# Patient Record
Sex: FEMALE | Race: BLACK OR AFRICAN AMERICAN | NOT HISPANIC OR LATINO | Employment: FULL TIME | URBAN - METROPOLITAN AREA
[De-identification: names, ages, dates, MRNs, and addresses within clinical notes are randomized per-mention and may not be internally consistent; named-entity substitution may affect disease eponyms.]

---

## 2020-06-03 ENCOUNTER — HISTORICAL (OUTPATIENT)
Dept: RADIOLOGY | Facility: HOSPITAL | Age: 55
End: 2020-06-03

## 2020-11-03 ENCOUNTER — HISTORICAL (OUTPATIENT)
Dept: LAB | Facility: HOSPITAL | Age: 55
End: 2020-11-03

## 2020-11-03 LAB
DEPRECATED CALCIDIOL+CALCIFEROL SERPL-MC: 16.4 NG/ML (ref 6.6–49.9)
ESTRADIOL SERPL HS-MCNC: <24 PG/ML
FSH SERPL-ACNC: 68.54 MIU/ML
T3FREE SERPL-MCNC: 2.5 PG/ML (ref 2.2–4)
T4 SERPL-MCNC: 8.5 MCG/DL (ref 4.7–13.3)
TESTOST SERPL-MCNC: 15.31 NG/DL (ref 12.4–35.76)
TSH SERPL-ACNC: 0.66 MIU/ML (ref 0.36–3.74)

## 2020-11-17 ENCOUNTER — HISTORICAL (OUTPATIENT)
Dept: LAB | Facility: HOSPITAL | Age: 55
End: 2020-11-17

## 2020-11-17 LAB
ABS NEUT (OLG): 3.22 X10(3)/MCL (ref 2.1–9.2)
ALBUMIN SERPL-MCNC: 3.8 GM/DL (ref 3.5–5)
ALBUMIN/GLOB SERPL: 1.2 RATIO (ref 1.1–2)
ALP SERPL-CCNC: 71 UNIT/L (ref 40–150)
ALT SERPL-CCNC: 19 UNIT/L (ref 0–55)
AST SERPL-CCNC: 19 UNIT/L (ref 5–34)
BASOPHILS # BLD AUTO: 0 X10(3)/MCL (ref 0–0.2)
BASOPHILS NFR BLD AUTO: 0 %
BILIRUB SERPL-MCNC: 0.5 MG/DL
BILIRUBIN DIRECT+TOT PNL SERPL-MCNC: 0.2 MG/DL (ref 0–0.5)
BILIRUBIN DIRECT+TOT PNL SERPL-MCNC: 0.3 MG/DL (ref 0–0.8)
BUN SERPL-MCNC: 7.8 MG/DL (ref 9.8–20.1)
CALCIUM SERPL-MCNC: 9.3 MG/DL (ref 8.4–10.2)
CHLORIDE SERPL-SCNC: 99 MMOL/L (ref 98–107)
CHOLEST SERPL-MCNC: 259 MG/DL
CHOLEST/HDLC SERPL: 5 {RATIO} (ref 0–5)
CO2 SERPL-SCNC: 27 MMOL/L (ref 22–29)
CREAT SERPL-MCNC: 0.72 MG/DL (ref 0.55–1.02)
EOSINOPHIL # BLD AUTO: 0.3 X10(3)/MCL (ref 0–0.9)
EOSINOPHIL NFR BLD AUTO: 5 %
ERYTHROCYTE [DISTWIDTH] IN BLOOD BY AUTOMATED COUNT: 12.9 % (ref 11.5–17)
GLOBULIN SER-MCNC: 3.1 GM/DL (ref 2.4–3.5)
GLUCOSE SERPL-MCNC: 82 MG/DL (ref 74–100)
HCT VFR BLD AUTO: 43.6 % (ref 37–47)
HDLC SERPL-MCNC: 55 MG/DL (ref 35–60)
HGB BLD-MCNC: 13.8 GM/DL (ref 12–16)
IMM GRANULOCYTES # BLD AUTO: 0.01 % (ref 0–0.02)
IMM GRANULOCYTES NFR BLD AUTO: 0.2 % (ref 0–0.43)
LDLC SERPL CALC-MCNC: 163 MG/DL (ref 50–140)
LYMPHOCYTES # BLD AUTO: 2.2 X10(3)/MCL (ref 0.6–4.6)
LYMPHOCYTES NFR BLD AUTO: 36 %
MCH RBC QN AUTO: 33.1 PG (ref 27–31)
MCHC RBC AUTO-ENTMCNC: 31.7 GM/DL (ref 33–36)
MCV RBC AUTO: 104.6 FL (ref 80–94)
MONOCYTES # BLD AUTO: 0.4 X10(3)/MCL (ref 0.1–1.3)
MONOCYTES NFR BLD AUTO: 7 %
NEUTROPHILS # BLD AUTO: 3.22 X10(3)/MCL (ref 1.4–7.9)
NEUTROPHILS NFR BLD AUTO: 52 %
PLATELET # BLD AUTO: 326 X10(3)/MCL (ref 130–400)
PMV BLD AUTO: 8.3 FL (ref 9.4–12.4)
POTASSIUM SERPL-SCNC: 4.3 MMOL/L (ref 3.5–5.1)
PROT SERPL-MCNC: 6.9 GM/DL (ref 6.4–8.3)
RBC # BLD AUTO: 4.17 X10(6)/MCL (ref 4.2–5.4)
SODIUM SERPL-SCNC: 134 MMOL/L (ref 136–145)
TRIGL SERPL-MCNC: 207 MG/DL (ref 37–140)
VLDLC SERPL CALC-MCNC: 41 MG/DL
WBC # SPEC AUTO: 6.2 X10(3)/MCL (ref 4.5–11.5)

## 2021-04-15 ENCOUNTER — HISTORICAL (OUTPATIENT)
Dept: LAB | Facility: HOSPITAL | Age: 56
End: 2021-04-15

## 2021-04-15 LAB
ABS NEUT (OLG): 3.05 X10(3)/MCL (ref 2.1–9.2)
ALBUMIN SERPL-MCNC: 3.5 GM/DL (ref 3.5–5)
ALBUMIN/GLOB SERPL: 1.1 RATIO (ref 1.1–2)
ALP SERPL-CCNC: 61 UNIT/L (ref 40–150)
ALT SERPL-CCNC: 9 UNIT/L (ref 0–55)
AST SERPL-CCNC: 15 UNIT/L (ref 5–34)
BASOPHILS # BLD AUTO: 0 X10(3)/MCL (ref 0–0.2)
BASOPHILS NFR BLD AUTO: 1 %
BILIRUB SERPL-MCNC: 0.7 MG/DL
BILIRUBIN DIRECT+TOT PNL SERPL-MCNC: 0.2 MG/DL (ref 0–0.5)
BILIRUBIN DIRECT+TOT PNL SERPL-MCNC: 0.5 MG/DL (ref 0–0.8)
BUN SERPL-MCNC: 7.3 MG/DL (ref 9.8–20.1)
CALCIUM SERPL-MCNC: 8.6 MG/DL (ref 8.4–10.2)
CHLORIDE SERPL-SCNC: 107 MMOL/L (ref 98–107)
CHOLEST SERPL-MCNC: 210 MG/DL
CHOLEST/HDLC SERPL: 4 {RATIO} (ref 0–5)
CO2 SERPL-SCNC: 23 MMOL/L (ref 22–29)
CREAT SERPL-MCNC: 0.76 MG/DL (ref 0.55–1.02)
DEPRECATED CALCIDIOL+CALCIFEROL SERPL-MC: 29.2 NG/ML (ref 30–80)
EOSINOPHIL # BLD AUTO: 0.3 X10(3)/MCL (ref 0–0.9)
EOSINOPHIL NFR BLD AUTO: 5 %
ERYTHROCYTE [DISTWIDTH] IN BLOOD BY AUTOMATED COUNT: 13.4 % (ref 11.5–17)
ESTRADIOL SERPL HS-MCNC: 29 PG/ML
FSH SERPL-ACNC: 36.08 MIU/ML
GLOBULIN SER-MCNC: 3.1 GM/DL (ref 2.4–3.5)
GLUCOSE SERPL-MCNC: 88 MG/DL (ref 74–100)
HCT VFR BLD AUTO: 42.5 % (ref 37–47)
HDLC SERPL-MCNC: 54 MG/DL (ref 35–60)
HGB BLD-MCNC: 13.5 GM/DL (ref 12–16)
IMM GRANULOCYTES # BLD AUTO: 0.01 % (ref 0–0.02)
IMM GRANULOCYTES NFR BLD AUTO: 0.2 % (ref 0–0.43)
LDLC SERPL CALC-MCNC: 115 MG/DL (ref 50–140)
LYMPHOCYTES # BLD AUTO: 1.6 X10(3)/MCL (ref 0.6–4.6)
LYMPHOCYTES NFR BLD AUTO: 30 %
MCH RBC QN AUTO: 33.4 PG (ref 27–31)
MCHC RBC AUTO-ENTMCNC: 31.8 GM/DL (ref 33–36)
MCV RBC AUTO: 105.2 FL (ref 80–94)
MONOCYTES # BLD AUTO: 0.4 X10(3)/MCL (ref 0.1–1.3)
MONOCYTES NFR BLD AUTO: 7 %
NEUTROPHILS # BLD AUTO: 3.05 X10(3)/MCL (ref 1.4–7.9)
NEUTROPHILS NFR BLD AUTO: 56 %
PLATELET # BLD AUTO: 346 X10(3)/MCL (ref 130–400)
PMV BLD AUTO: 8.4 FL (ref 9.4–12.4)
POTASSIUM SERPL-SCNC: 4.2 MMOL/L (ref 3.5–5.1)
PROT SERPL-MCNC: 6.6 GM/DL (ref 6.4–8.3)
RBC # BLD AUTO: 4.04 X10(6)/MCL (ref 4.2–5.4)
SODIUM SERPL-SCNC: 138 MMOL/L (ref 136–145)
TESTOST SERPL-MCNC: 74.12 NG/DL (ref 12.4–35.76)
TRIGL SERPL-MCNC: 203 MG/DL (ref 37–140)
TSH SERPL-ACNC: 1.77 UIU/ML (ref 0.35–4.94)
VLDLC SERPL CALC-MCNC: 41 MG/DL
WBC # SPEC AUTO: 5.4 X10(3)/MCL (ref 4.5–11.5)

## 2021-04-20 ENCOUNTER — HISTORICAL (OUTPATIENT)
Dept: RADIOLOGY | Facility: HOSPITAL | Age: 56
End: 2021-04-20

## 2021-05-03 ENCOUNTER — HISTORICAL (OUTPATIENT)
Dept: RADIOLOGY | Facility: HOSPITAL | Age: 56
End: 2021-05-03

## 2021-08-23 LAB — GLUCOSE SERPL-MCNC: 345 MG/DL (ref 74–106)

## 2022-04-10 ENCOUNTER — HISTORICAL (OUTPATIENT)
Dept: ADMINISTRATIVE | Facility: HOSPITAL | Age: 57
End: 2022-04-10
Payer: COMMERCIAL

## 2022-04-29 VITALS
WEIGHT: 155.56 LBS | HEIGHT: 64 IN | BODY MASS INDEX: 26.56 KG/M2 | DIASTOLIC BLOOD PRESSURE: 88 MMHG | OXYGEN SATURATION: 99 % | SYSTOLIC BLOOD PRESSURE: 133 MMHG

## 2022-05-05 ENCOUNTER — PATIENT OUTREACH (OUTPATIENT)
Dept: ADMINISTRATIVE | Facility: HOSPITAL | Age: 57
End: 2022-05-05
Payer: COMMERCIAL

## 2022-05-05 ENCOUNTER — TELEPHONE (OUTPATIENT)
Dept: ADMINISTRATIVE | Facility: HOSPITAL | Age: 57
End: 2022-05-05
Payer: COMMERCIAL

## 2022-09-13 LAB — BCS RECOMMENDATION EXT: NORMAL

## 2022-09-15 ENCOUNTER — HISTORICAL (OUTPATIENT)
Dept: ADMINISTRATIVE | Facility: HOSPITAL | Age: 57
End: 2022-09-15
Payer: COMMERCIAL

## 2022-10-28 LAB
HUMAN PAPILLOMAVIRUS (HPV): NORMAL
PAP RECOMMENDATION EXT: NORMAL
PAP SMEAR: NORMAL

## 2023-04-27 ENCOUNTER — TELEPHONE (OUTPATIENT)
Dept: FAMILY MEDICINE | Facility: CLINIC | Age: 58
End: 2023-04-27
Payer: COMMERCIAL
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
## 2023-08-08 LAB
CHOLEST SERPL-MSCNC: 225 MG/DL (ref 0–200)
HDLC SERPL-MCNC: 58 MG/DL (ref 35–70)
LDLC SERPL CALC-MCNC: 130 MG/DL (ref 0–160)
TRIGL SERPL-MCNC: 184 MG/DL (ref 40–160)

## 2023-09-17 ENCOUNTER — HOSPITAL ENCOUNTER (EMERGENCY)
Facility: HOSPITAL | Age: 58
Discharge: HOME | End: 2023-09-17
Attending: STUDENT IN AN ORGANIZED HEALTH CARE EDUCATION/TRAINING PROGRAM
Payer: COMMERCIAL

## 2023-09-17 VITALS
TEMPERATURE: 98 F | SYSTOLIC BLOOD PRESSURE: 155 MMHG | RESPIRATION RATE: 18 BRPM | HEART RATE: 92 BPM | WEIGHT: 162 LBS | BODY MASS INDEX: 28 KG/M2 | DIASTOLIC BLOOD PRESSURE: 86 MMHG | OXYGEN SATURATION: 100 %

## 2023-09-17 DIAGNOSIS — S50.12XA CONTUSION OF LEFT FOREARM, INITIAL ENCOUNTER: ICD-10-CM

## 2023-09-17 DIAGNOSIS — T14.90XA TRAUMA: ICD-10-CM

## 2023-09-17 DIAGNOSIS — S50.812A ABRASION OF LEFT FOREARM, INITIAL ENCOUNTER: ICD-10-CM
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
## 2023-09-17 PROCEDURE — 63600175 PHARM REV CODE 636 W HCPCS: Performed by: STUDENT IN AN ORGANIZED HEALTH CARE EDUCATION/TRAINING PROGRAM

## 2023-09-17 PROCEDURE — 25000003 PHARM REV CODE 250: Performed by: STUDENT IN AN ORGANIZED HEALTH CARE EDUCATION/TRAINING PROGRAM

## 2023-09-17 PROCEDURE — 96372 THER/PROPH/DIAG INJ SC/IM: CPT | Performed by: STUDENT IN AN ORGANIZED HEALTH CARE EDUCATION/TRAINING PROGRAM

## 2023-09-17 PROCEDURE — 99284 EMERGENCY DEPT VISIT MOD MDM: CPT

## 2023-09-17 RX ORDER — BACITRACIN ZINC 500 UNIT/G
OINTMENT (GRAM) TOPICAL 3 TIMES DAILY
Qty: 30 G | Refills: 0 | Status: SHIPPED | OUTPATIENT
Start: 2023-09-17 | End: 2023-09-27

## 2023-09-17 RX ORDER — KETOROLAC TROMETHAMINE 30 MG/ML
30 INJECTION, SOLUTION INTRAMUSCULAR; INTRAVENOUS
Status: COMPLETED | OUTPATIENT
Start: 2023-09-17 | End: 2023-09-17

## 2023-09-17 RX ORDER — TRAMADOL HYDROCHLORIDE 50 MG/1
50 TABLET ORAL EVERY 6 HOURS PRN
Qty: 12 TABLET | Refills: 0 | Status: SHIPPED | OUTPATIENT
Start: 2023-09-17 | End: 2023-09-20 | Stop reason: SDUPTHER

## 2023-09-17 RX ORDER — TRAMADOL HYDROCHLORIDE 50 MG/1
50 TABLET ORAL
Status: COMPLETED | OUTPATIENT
Start: 2023-09-17 | End: 2023-09-17

## 2023-09-17 RX ORDER — BACITRACIN 500 [USP'U]/G
500 OINTMENT TOPICAL
Status: COMPLETED | OUTPATIENT
Start: 2023-09-17 | End: 2023-09-17

## 2023-09-17 RX ORDER — IBUPROFEN 800 MG/1
800 TABLET ORAL EVERY 6 HOURS PRN
Qty: 20 TABLET | Refills: 0 | Status: SHIPPED | OUTPATIENT
Start: 2023-09-17 | End: 2023-09-20 | Stop reason: ALTCHOICE

## 2023-09-17 RX ADMIN — KETOROLAC TROMETHAMINE 30 MG: 30 INJECTION, SOLUTION INTRAMUSCULAR at 09:09

## 2023-09-17 RX ADMIN — TRAMADOL HYDROCHLORIDE 50 MG: 50 TABLET, COATED ORAL at 10:09

## 2023-09-17 RX ADMIN — BACITRACIN 500 G: 500 OINTMENT TOPICAL at 11:09

## 2023-09-17 NOTE — ED NOTES
Spoke with patient states has a ride that can come pick her up still having pain 8/10 new orders to give pain meds . Report given to Margi Maldonado

## 2023-09-17 NOTE — DISCHARGE INSTRUCTIONS
Monitor blood pressure at home   Return to ER with any persistently elevated BP >160/90 and/or symptoms of HA, vision changes, nausea/vomiting/chest pain, shortness of breath, dizziness, weakness, numbness/tingling or any other concerning symptoms  Keep wounds clean and dry/use antibacterial ointment
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
## 2023-09-17 NOTE — Clinical Note
"Marj"Roosevelt Lazar was seen and treated in our emergency department on 9/17/2023.  She may return to work after being cleared by follow-up physician .  Must be cleared by primary physician or orthopedic  before returning  to work     If you have any questions or concerns, please don't hesitate to call.      Dr. Weber/Suleiman HEALY"

## 2023-09-18 LAB
BCS RECOMMENDATION EXT: NORMAL
BCS RECOMMENDATION EXT: NORMAL

## 2023-09-20 ENCOUNTER — OFFICE VISIT (OUTPATIENT)
Dept: ORTHOPEDICS | Facility: CLINIC | Age: 58
End: 2023-09-20
Payer: COMMERCIAL

## 2023-09-20 VITALS
DIASTOLIC BLOOD PRESSURE: 91 MMHG | HEIGHT: 64 IN | SYSTOLIC BLOOD PRESSURE: 153 MMHG | WEIGHT: 150 LBS | HEART RATE: 89 BPM | BODY MASS INDEX: 25.61 KG/M2

## 2023-09-20 DIAGNOSIS — M79.642 LEFT HAND PAIN: Primary | ICD-10-CM

## 2023-09-20 DIAGNOSIS — S51.812A LACERATION OF LEFT FOREARM, INITIAL ENCOUNTER: ICD-10-CM

## 2023-09-20 DIAGNOSIS — T14.8XXA CONTUSION OF BONE: ICD-10-CM

## 2023-09-20 DIAGNOSIS — T14.8XXA MUSCLE TEAR: ICD-10-CM

## 2023-09-20 PROCEDURE — 99204 OFFICE O/P NEW MOD 45 MIN: CPT | Mod: ,,, | Performed by: ORTHOPAEDIC SURGERY

## 2023-09-20 PROCEDURE — 99204 PR OFFICE/OUTPT VISIT, NEW, LEVL IV, 45-59 MIN: ICD-10-PCS | Mod: ,,, | Performed by: ORTHOPAEDIC SURGERY

## 2023-09-20 RX ORDER — KETOCONAZOLE 20 MG/ML
SHAMPOO, SUSPENSION TOPICAL
COMMUNITY
Start: 2023-08-07

## 2023-09-20 RX ORDER — ATORVASTATIN CALCIUM 20 MG/1
TABLET, FILM COATED ORAL
COMMUNITY
End: 2023-10-18 | Stop reason: SDUPTHER

## 2023-09-20 RX ORDER — LOSARTAN POTASSIUM AND HYDROCHLOROTHIAZIDE 12.5; 5 MG/1; MG/1
1 TABLET ORAL DAILY
COMMUNITY
End: 2023-10-18

## 2023-09-20 RX ORDER — KETOCONAZOLE 20 MG/G
CREAM TOPICAL
COMMUNITY
Start: 2023-08-07

## 2023-09-20 RX ORDER — TRAMADOL HYDROCHLORIDE 50 MG/1
50 TABLET ORAL EVERY 6 HOURS PRN
Qty: 21 TABLET | Refills: 0 | Status: SHIPPED | OUTPATIENT
Start: 2023-09-20 | End: 2023-11-02

## 2023-09-20 RX ORDER — MELOXICAM 15 MG/1
15 TABLET ORAL DAILY PRN
Qty: 30 TABLET | Refills: 0 | Status: SHIPPED | OUTPATIENT
Start: 2023-09-20 | End: 2023-10-19

## 2023-09-20 RX ORDER — ERGOCALCIFEROL 1.25 MG/1
50000 CAPSULE ORAL
COMMUNITY
Start: 2023-08-30

## 2023-09-20 NOTE — LETTER
[facility] FORM 1010 - REQUEST OF AUTHORIZATION/CARRIER OR SELF INSURED EMPLOYER RESPONSE  PLEASE PRINT OR TYPE  SECTION 1. IDENTIFYING INFORMATION - To Be Filled Out By Health Care Provider    P  A Last Name:   Nagi First:   Marj Middle:     Street Address, [facility], Zip:   [address] , [place] [address]   T  I Last 4 Digits of Social Security Number:   [id number] YOB: 1965 Phone Number:    [phone] (home) [phone] (work) Date of Injury:   09/17/2023   E  N  T Employers Name:    [employer]   Street Address, [facility], Zip:   [address] [place], [place], [address] Phone Number:   [phone]     C  A  R  R Name:   [place] :   Kaitlin Osborne Claim Number (if known):   [id number]     I  E  R Street Address, City, State, Zip:   [facility]  [address] [place] [address] Email Address:    Phone Number:   [phone] Fax Number:   [phone]   SECTION 2. REQUEST FOR AUTHORIZATION - To Be Filled Out By Health Care Provider      P Requesting Health Care Provider:   Oscar Chapa  Phone Number:   [phone] Fax Number:   [phone]   R  O Street Address, City, State, Zip:   [address] [address] Email:    [email or web address]   V  I Diagnosis:   Muscle tear and laceration  CPT/DRG Code:   32273 ICD/DSM Code:   T14.8  S51.812A   D  E Requested Treatment or Testing (Attach Supplement If Needed): MRI of the left forearm to see if the muscle is torn    R Reason for Treatment or Testing (Attach Supplement If Needed): see attached notes     INFORMATION REQUIRED BY RULE TO BE INCLUDED WITH REQUEST FOR AUTHORIZATION - To Be Filled Out By Health Care Provider  (Following is the required minimum information for Request of Authorization (LAC 40:2715 (C))                    [x]  History provided to the level of condition and as provided by Medical Treatment Schedule   P                  [x]  Physical Findings/Clinical Tests   R                  []  Documented functional improvements from prior treatment   O                 []  Test/imaging results   V                 []  Treatment Plan including services being requested along with the frequency and duration   I  D  E                                                                                                                                            [x]     Faxed          to the [employer]/Self Insured Employer on this the      I hereby certify that this completed form and above required information was                                                           20     day of 09, 2023                                                                                                                             []      Emailed                  (day)                 (month)         (year)   R Signature of Health Care Provider:    Oscar Zhang per attached records Printed Name:    Oscar goddard attached records   SECTION 3. RESPONSE OF CARRIER/SELF INSURED EMPLOYER FOR AUTHORIZATION  (Check appropriate box below and return to requesting Health Care Provider, Claimant and Claimant  as provided by rule)       []  The requested Treatment or Testing is approved       []  The requested Treatment or Testing is approved with modifications (Attach summary of reasons and explanation of any modifications)       []  The requested Treatment or Testing is denied because                                       []          Not in accordance with Medical Treatment Schedule or R.S.23:1203.1(D) (Attach summary of reasons)                                       []          The request, or a portion thereof, is not related to the on-the-job injury                                       []          The claim is being denied as non-compensable                                       []          Other (Attach brief explanation)   C  A  R  R  I                                                                                                                                             []     Faxed          to the Health Care Provider (and to the  of                                                                                                                                                                             Claimant if one exists, if denied or approved with   I hereby certify that this response of  [employer]/Self Insured Employer for Authorization was                                                 modification) on this the                                                                                                                                                                                     ______  day of   _______ ,   _______                                                                                                                             []      Emailed                  (day)                 (month)         (year)   E  R Signature of Carrier/Self Insured Employer or Utilization Review Company: Printed Name:           []   The prior denied or approved with modification request is now  approved                                                                                                                                               []     Faxed          to the Health Care Provider and  of Claimant                                                                                                                                                                                                    if one exists on this the   I hereby certify that this response of  [employer]/Self Insured Employer for Authorization was                                      ______  day of   _______ ,   _______                                                                                                                                             []      Emailed                      (day)                  (month)         (year)    Signature of Carrier/Self Insured Employer or Utilization Review Company:  Printed Name:       SECTION 4. FIRST REQUEST   (Form 1010A is required to be filled out by Car[employer]/Self Insured Employer and Health Care Provider)   C           []  The requested Treatment or Testing is delayed because minimum information required by rule was not provided   A  R  R  I                                                                                                                                            []     Faxed                 to the Health Care Provider on this the      I hereby certify that this First Request and accompanying Form 1010A was                                                       ______  day of   _______ ,   _______                                                                                                                             []      Emailed                  (day)                 (month)         (year)   E  R Signature of Carrier/Self Insured Employer or Utilization Review Company:     P  R  O  V  I  D                                                                                                                                            []     Faxed          to the Carrier/Self Insured Employer on this the                                I hereby certify that a response to the First Request and                                               accompanying Form 1010A was                                                                                 ______  day of   _______ ,   _______                                                                                                                             []      Emailed                  (day)                 (month)         (year)   E  R Signature of Health Care Provider: Printed Name:   SECTION 5. SUSPENSION OF PRIOR AUTHORIZATION DUE TO LACK OF INFORMATION       C   Suspension of Prior Authorization Process  due to Lack of Information     A  R           []  The requested Treatment or Testing is delayed due to a Suspension of Prior Authorization Due to Lack of Information   R  I  E                                                                                                                           []     Faxed                 to the Health Care Provider on this the      I hereby certify that this Suspension of Prior Authorization was                                                       ______  day of   _______ ,   _______                                                                                                                            []      Emailed                  (day)                 (month)         (year)   R Signature of Carrier/Self Insured Employer or Utilization Review Company:   Printed Name:       P    Appeal of Suspension to Medical Services Section by Health Care Provider     R  O  V  I hereby certify that this form and all information previously submitted to [employer]/Self Insured Employer   was faxed to [employer]  (Fax Number: [phone] this ______  day of   _______ ,   _______   I  D  E                                                                                                                           []     Faxed       to the Carrier/Self Insured Employer on this the      I hereby certify that this Appeal of Suspension of Prior Authorization was                                        ______  day of   _______ ,   _______                                                                                                                            []      Emailed                  (day)                 (month)         (year)   R Signature of Health Care Provider:   Printed Name:     SECTION 6. DETERMINATION OF MEDICAL SERVICES SECTION              []  The required information of LAC40:2715(C) was not provided              []  The required information of LAC40:2715(C) was provided    O  W  C  A                                                                                                                           []     Faxed           to the Health Care Provider  & [employer]/Self                                                                                                                                                                       Insured Employer on this the                      I hereby certify that a written determination was                                                                ______  day of   _______ ,   _______                                                                                                                            []      Emailed                  (day)                 (month)         (year)    Signature: Printed Name:     SECTION 7. HEALTH CARE PROVIDER RESPONSE TO MEDICAL SERVICES DETERMINATION   P  R  O  V  I  D                                                                                                                             []     Faxed       to the Car[employer]/Self Insured Employer on this the   I hereby certify that additional information, pursuant to the determination of                                       Medical Services Section, was                                    []      Emailed              ______  day of   _______ ,   _______                                                                                                                                                                     (day)                 (month)         (year)   E  R Signature of Health Care Provider: Printed Name:
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
## 2023-09-20 NOTE — LETTER
[facility]  [address] [place] [address]. [address], [address]  Phone: [phone]  Fax: [phone]    Name:Marj Lazar  :1965   Date:2023     PATIENT IS UNABLE TO WORK AS OF:  23   [_] Pending treatment.  [X] For approximately [_] Days [3] Weeks [_] Months  [X] Pending diagnostic testing.  [_] Pending surgical treatment.  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  Although a sedentary job is defined as one which involved sitting, a certain amount of walking and standing are required only occasionally and other sedentary criteria are met.    [_] LIGHT WORK: Lifting 20 pounds with frequent lifting and/or carrying objects weighing up to 10 pounds.  Even though the weight lifted may be only a negotiable amount, a job is in the category when it involves sitting most of the time with a degree of pushing/pulling of arm and/or leg controls.    [_] MEDIUM WORK: Lifting of 50 pounds maximum with frequent lifting and/or carrying of objects up to 25 pounds.    [_] HEAVY WORK: Lifting of 100 pounds maximum with frequent lifting and/or carrying objects up to 50 pounds.    [_] VERY HEAVY WORK: Lifting objects in excess of 100 pounds with frequent lifting and/or carrying of objects weighing 50 pounds or more.    [_] REGULAR DUTY: [_] No Restrictions. [_] With Restrictions (See comments below0:    COMMENTS    Oscar Chapa MD

## 2023-09-25 ENCOUNTER — TELEPHONE (OUTPATIENT)
Dept: ORTHOPEDICS | Facility: CLINIC | Age: 58
End: 2023-09-25
Payer: COMMERCIAL

## 2023-09-25 NOTE — TELEPHONE ENCOUNTER
[facility].     CALLED THEM BACK AND THEY STATED THEY NEED THE LAST OFFICE NOTE; TRANSFERRED TO MEDICAL RECORDS.

## 2023-09-28 ENCOUNTER — HOSPITAL ENCOUNTER (OUTPATIENT)
Dept: RADIOLOGY | Facility: HOSPITAL | Age: 58
Discharge: HOME | End: 2023-09-28
Attending: ORTHOPAEDIC SURGERY
Payer: COMMERCIAL

## 2023-09-28 DIAGNOSIS — S51.812A LACERATION OF LEFT FOREARM, INITIAL ENCOUNTER: ICD-10-CM

## 2023-09-28 DIAGNOSIS — T14.8XXA CONTUSION OF BONE: ICD-10-CM

## 2023-09-28 DIAGNOSIS — T14.8XXA MUSCLE TEAR: ICD-10-CM

## 2023-09-28 PROCEDURE — 73218 MRI UPPER EXTREMITY W/O DYE: CPT | Mod: TC,LT

## 2023-10-04 ENCOUNTER — OFFICE VISIT (OUTPATIENT)
Dept: ORTHOPEDICS | Facility: CLINIC | Age: 58
End: 2023-10-04
Payer: COMMERCIAL

## 2023-10-04 DIAGNOSIS — T14.8XXA MUSCLE TEAR: ICD-10-CM

## 2023-10-04 DIAGNOSIS — S50.12XA: Primary | ICD-10-CM

## 2023-10-04 PROCEDURE — 99214 OFFICE O/P EST MOD 30 MIN: CPT | Mod: ,,, | Performed by: ORTHOPAEDIC SURGERY

## 2023-10-04 PROCEDURE — 99214 PR OFFICE/OUTPT VISIT, EST, LEVL IV, 30-39 MIN: ICD-10-PCS | Mod: ,,, | Performed by: ORTHOPAEDIC SURGERY

## 2023-10-04 RX ORDER — PILOCARPINE HYDROCHLORIDE 12.5 MG/ML
SOLUTION/ DROPS OPHTHALMIC
COMMUNITY
End: 2024-03-08

## 2023-10-04 NOTE — PROGRESS NOTES
Subjective:    CC: Results of the Left Forearm ((WC ) lt forearm MRI results, pt states took pain meds this morning pain not so bad, pt wears sling PRN.)       HPI:  Patient returns today for repeat exam.  Patient continues to have some bruising, swelling about her left forearm.  Difficulty with moving her fingers at times.  She is been in a sling she is presently with family.  She did have an MRI we have discussed her results.    ROS: Refer to HPI for pertinent ROS. All other 12 point systems negative.    Objective:  There were no vitals filed for this visit.     Physical Exam:  Left upper extremity compartment soft and warm.  Skin is intact.  There is no signs symptoms of DVT or infection.  She does have bruising and swelling globally about the left forearm.  Previous cut along the dorsal surface is healing nicely, there is no signs of infection, she has multiple areas of generalized swelling.  Her flexor and extensor tendons are grossly intact sensation intact to light touch palpable radial pulse.  There is no signs of numbness or tingling.    Images: . Images Reviewed and discussed with patient.    Assessment:  1. Contusion, forearm and elbow, left, initial encounter  - Ambulatory referral/consult to Physical/Occupational Therapy; Future    2. Muscle tear  - Ambulatory referral/consult to Physical/Occupational Therapy; Future        Plan:  At this time we discussed her physical exam and MRI findings.  We will continue conservative treatment.  We will start physical therapy for strength and range of motion, she will wean her sling, she will continue with the Mobic with appropriate precautions.  I would like see her back in 4 weeks to see how she is progressing.  She will hold off on her job duties at this time.    Follow UP: No follow-ups on file.

## 2023-10-04 NOTE — LETTER
[facility]  [address] [place] [address]. [address], [address]  Phone: [phone]  Fax: [phone]    Name:Marj Lazar  :1965   Date:10/04/2023     PATIENT IS UNABLE TO WORK AS OF: 10/4/2023  [_] Pending treatment.  [x] For approximately [_] Days [4] Weeks [_] Months  [_] Pending diagnostic testing.  [_] Pending surgical treatment.  [_] For approximately _ months (Post Surgery)    PATIENT IS ABLE TO RETURN TO WORK AS OF:re-eval at next appt on 23      COMMENTS     Oscar Chapa MD / Magy Betts PA-C

## 2023-10-09 ENCOUNTER — TELEPHONE (OUTPATIENT)
Dept: ORTHOPEDICS | Facility: CLINIC | Age: 58
End: 2023-10-09
Payer: COMMERCIAL

## 2023-10-09 NOTE — TELEPHONE ENCOUNTER
Patient called to see if the 1010 for PT was sent. Called patient and informed her that the 1010 is sent by the physical therapy location of her choosing.     Pt verbalized understanding and will call with any questions or concerns.

## 2023-10-18 ENCOUNTER — PATIENT OUTREACH (OUTPATIENT)
Dept: ADMINISTRATIVE | Facility: HOSPITAL | Age: 58
End: 2023-10-18
Payer: COMMERCIAL

## 2023-10-18 ENCOUNTER — TELEPHONE (OUTPATIENT)
Dept: FAMILY MEDICINE | Facility: CLINIC | Age: 58
End: 2023-10-18

## 2023-10-18 ENCOUNTER — OFFICE VISIT (OUTPATIENT)
Dept: FAMILY MEDICINE | Facility: CLINIC | Age: 58
End: 2023-10-18
Payer: COMMERCIAL

## 2023-10-18 VITALS
DIASTOLIC BLOOD PRESSURE: 96 MMHG | SYSTOLIC BLOOD PRESSURE: 158 MMHG | TEMPERATURE: 98 F | RESPIRATION RATE: 18 BRPM | WEIGHT: 152.69 LBS | BODY MASS INDEX: 26.07 KG/M2 | HEART RATE: 78 BPM | OXYGEN SATURATION: 98 % | HEIGHT: 64 IN

## 2023-10-18 DIAGNOSIS — I10 PRIMARY HYPERTENSION: Primary | ICD-10-CM

## 2023-10-18 DIAGNOSIS — E55.9 VITAMIN D DEFICIENCY: ICD-10-CM

## 2023-10-18 DIAGNOSIS — Z13.1 SCREENING FOR DIABETES MELLITUS: ICD-10-CM

## 2023-10-18 DIAGNOSIS — E78.2 MIXED HYPERLIPIDEMIA: ICD-10-CM

## 2023-10-18 DIAGNOSIS — Z11.59 NEED FOR HEPATITIS C SCREENING TEST: ICD-10-CM

## 2023-10-18 DIAGNOSIS — Z00.00 ENCOUNTER FOR WELLNESS EXAMINATION: ICD-10-CM

## 2023-10-18 DIAGNOSIS — Z11.4 ENCOUNTER FOR SCREENING FOR HIV: ICD-10-CM

## 2023-10-18 PROBLEM — G47.33 OSA (OBSTRUCTIVE SLEEP APNEA): Status: RESOLVED | Noted: 2023-10-18 | Resolved: 2023-10-18

## 2023-10-18 PROBLEM — G47.33 OSA (OBSTRUCTIVE SLEEP APNEA): Status: ACTIVE | Noted: 2023-10-18

## 2023-10-18 PROCEDURE — 3080F PR MOST RECENT DIASTOLIC BLOOD PRESSURE >= 90 MM HG: ICD-10-PCS | Mod: CPTII,,, | Performed by: FAMILY MEDICINE

## 2023-10-18 PROCEDURE — 3080F DIAST BP >= 90 MM HG: CPT | Mod: CPTII,,, | Performed by: FAMILY MEDICINE

## 2023-10-18 PROCEDURE — 1159F MED LIST DOCD IN RCRD: CPT | Mod: CPTII,,, | Performed by: FAMILY MEDICINE

## 2023-10-18 PROCEDURE — 3077F PR MOST RECENT SYSTOLIC BLOOD PRESSURE >= 140 MM HG: ICD-10-PCS | Mod: CPTII,,, | Performed by: FAMILY MEDICINE

## 2023-10-18 PROCEDURE — 1159F PR MEDICATION LIST DOCUMENTED IN MEDICAL RECORD: ICD-10-PCS | Mod: CPTII,,, | Performed by: FAMILY MEDICINE

## 2023-10-18 PROCEDURE — 3077F SYST BP >= 140 MM HG: CPT | Mod: CPTII,,, | Performed by: FAMILY MEDICINE

## 2023-10-18 PROCEDURE — 99214 PR OFFICE/OUTPT VISIT, EST, LEVL IV, 30-39 MIN: ICD-10-PCS | Mod: ,,, | Performed by: FAMILY MEDICINE

## 2023-10-18 PROCEDURE — 99214 OFFICE O/P EST MOD 30 MIN: CPT | Mod: ,,, | Performed by: FAMILY MEDICINE

## 2023-10-18 PROCEDURE — 1160F PR REVIEW ALL MEDS BY PRESCRIBER/CLIN PHARMACIST DOCUMENTED: ICD-10-PCS | Mod: CPTII,,, | Performed by: FAMILY MEDICINE

## 2023-10-18 PROCEDURE — 1160F RVW MEDS BY RX/DR IN RCRD: CPT | Mod: CPTII,,, | Performed by: FAMILY MEDICINE

## 2023-10-18 PROCEDURE — 3008F BODY MASS INDEX DOCD: CPT | Mod: CPTII,,, | Performed by: FAMILY MEDICINE

## 2023-10-18 PROCEDURE — 3008F PR BODY MASS INDEX (BMI) DOCUMENTED: ICD-10-PCS | Mod: CPTII,,, | Performed by: FAMILY MEDICINE

## 2023-10-18 RX ORDER — ATORVASTATIN CALCIUM 20 MG/1
20 TABLET, FILM COATED ORAL DAILY
Qty: 90 TABLET | Refills: 3 | Status: SHIPPED | OUTPATIENT
Start: 2023-10-18 | End: 2024-10-17

## 2023-10-18 RX ORDER — LOSARTAN POTASSIUM AND HYDROCHLOROTHIAZIDE 25; 100 MG/1; MG/1
1 TABLET ORAL DAILY
Qty: 90 TABLET | Refills: 3 | Status: SHIPPED | OUTPATIENT
Start: 2023-10-18 | End: 2024-10-17

## 2023-10-18 NOTE — LETTER
"  This communication is flagged as high priority.        AUTHORIZATION FOR RELEASE OF   CONFIDENTIAL INFORMATION    Dear Medical Records      We are seeing Marj Lazar, date of birth 1965, in the clinic at [facility]. Kelly Garces MD is the patient's PCP. Marj Lazar has an outstanding lab/procedure at the time we reviewed her chart. In order to help keep her health information updated, she has authorized us to request the following medical record(s):        (xx  )  MAMMOGRAM                                      (  )  COLONOSCOPY      (  )  PAP SMEAR                                          (  )  OUTSIDE LAB RESULTS     (  )  DEXA SCAN                                          (  )  EYE EXAM            (  )  FOOT EXAM                                          (  )  ENTIRE RECORD     (  )  OUTSIDE IMMUNIZATIONS                 (  )  _______________         Please fax records to [facility], Bienvenu-Oubre, Shauna, MD,  [phone]  Attn: Sonia      If you have any questions, please contact Donna Maya (Connie)Care Coordinator @ [phone]     Patient Name: Marj Lazar  : 1965  Patient Phone #: [phone]     "

## 2023-10-18 NOTE — ASSESSMENT & PLAN NOTE
Increase losartan/hctz and rtc 2 wks   Side effects discussed    continue current meds, low salt diet, weight loss and exercise  Avoid drinking too much caffeine  Call me with pressure more than 140/90

## 2023-10-18 NOTE — PROGRESS NOTES
"Marj Lazar  10/18/2023  [id number]    Kelly Garces MD  Patient Care Team:  Kelly Garces MD as PCP - General (Family Medicine)      Chief Complaint:  Chief Complaint   Patient presents with    Establish Care     Needs new PCP-Previously seeing Dr Benitez. Pt states that her b/p has been elevated and she having headaches every day.       History of Present Illness:  HPI    58 y.o. female who presents today to establish care. She has HTN, HLD, DAVID, vitamin d deficiency. I have no recent labs to review. She reports that her bp has been elevated resulting in frequent headaches. She also had a near syncopal episode and her bp was 180/120's. She has no other symptoms.     Review of Systems  General: denies f/c, weight loss, night sweats, decreased appetite  Eye: denies blurred vision, changes in vision  Respiratory: denies sob, wheezing, cough  Cardiovascular: denies chest pain, palpitations, edema  Gastrointestinal: denies abdominal pain, n/v, constipation, diarrhea  Integumentary: denies rashes, pruritis        Health Maintenance  Health Maintenance Topics with due status: Not Due       Topic Last Completion Date    TETANUS VACCINE 11/17/2017    Cervical Cancer Screening 02/08/2021    Colorectal Cancer Screening 12/29/2021    Lipid Panel 08/24/2022     Health Maintenance Due   Topic Date Due    HIV Screening  Never done    Hemoglobin A1c (Diabetic Prevention Screening)  Never done    Shingles Vaccine (2 of 2) 11/28/2022    Influenza Vaccine (1) 09/01/2023    COVID-19 Vaccine (3 - 2023-24 season) 09/01/2023    Mammogram  09/13/2023       Exam:  Vitals:    10/18/23 1044   BP: (!) 158/96   BP Location: Right arm   Patient Position: Sitting   BP Method: Small (Automatic)   Pulse: 78   Resp: 18   Temp: 98.2 °F (36.8 °C)   TempSrc: Oral   SpO2: 98%   Weight: 69.3 kg (152 lb 11.2 oz)   Height: 5' 4" (1.626 m)     Weight: 69.3 kg (152 lb 11.2 oz)   Body mass index is 26.21 kg/m².      Physical " Exam  Constitutional: NAD, alert, pleasant  Respiratory: CTAB, no wheezes, rales or rhonchi. No accessory muscle use  Eyes: EOMI  Cardiovascular: RRR, No m/r/g. No JVD. No LE edema  Integumentary: warm, dry, intact  Psych: AA&Ox3      ICD-10-CM ICD-9-CM   1. Primary hypertension  I10 401.9   2. Vitamin D deficiency  E55.9 268.9   3. Mixed hyperlipidemia  E78.2 272.2   4. Encounter for wellness examination  Z00.00 V70.0   5. Screening for diabetes mellitus  Z13.1 V77.1   6. Need for hepatitis C screening test  Z11.59 V73.89   7. Encounter for screening for HIV  Z11.4 V73.89       1. Primary hypertension  Assessment & Plan:  Increase losartan/hctz and rtc 2 wks   Side effects discussed    continue current meds, low salt diet, weight loss and exercise  Avoid drinking too much caffeine  Call me with pressure more than 140/90      Orders:  -     losartan-hydrochlorothiazide 100-25 mg (HYZAAR) 100-25 mg per tablet; Take 1 tablet by mouth once daily.  Dispense: 90 tablet; Refill: 3  -     CBC Auto Differential; Future; Expected date: 04/18/2024  -     Comprehensive Metabolic Panel; Future; Expected date: 04/18/2024  -     Lipid Panel; Future; Expected date: 04/18/2024  -     Urinalysis; Future; Expected date: 04/18/2024    2. Vitamin D deficiency  Overview:  On weekly ergocalciferol.     Will request vitamin d from prior pcp    Orders:  -     Vitamin D; Future; Expected date: 04/18/2024    3. Mixed hyperlipidemia  Overview:  On lipitor 20 mg daily. No recent lipids to review. She needs a refill on statin. I will request labs from other pcp    Orders:  -     atorvastatin (LIPITOR) 20 MG tablet; Take 1 tablet (20 mg total) by mouth once daily.  Dispense: 90 tablet; Refill: 3  -     CBC Auto Differential; Future; Expected date: 04/18/2024  -     Comprehensive Metabolic Panel; Future; Expected date: 04/18/2024  -     Lipid Panel; Future; Expected date: 04/18/2024  -     Hemoglobin A1C; Future; Expected date: 04/18/2024    4.  Encounter for wellness examination  -     CBC Auto Differential; Future; Expected date: 04/18/2024  -     Comprehensive Metabolic Panel; Future; Expected date: 04/18/2024  -     Lipid Panel; Future; Expected date: 04/18/2024  -     TSH; Future; Expected date: 04/18/2024  -     Hemoglobin A1C; Future; Expected date: 04/18/2024  -     Urinalysis; Future; Expected date: 04/18/2024  -     Vitamin D; Future; Expected date: 04/18/2024  -     Hepatitis C Antibody; Future; Expected date: 04/18/2024    5. Screening for diabetes mellitus  -     Hemoglobin A1C; Future; Expected date: 04/18/2024    6. Need for hepatitis C screening test  -     Hepatitis C Antibody; Future; Expected date: 04/18/2024    7. Encounter for screening for HIV         Follow up: Follow up for 2 wks htn and wellness [facility] labs sept 2024.      Care Plan/Goals: Reviewed   Goals    None

## 2023-10-18 NOTE — LETTER
"  This communication is flagged as high priority.        AUTHORIZATION FOR RELEASE OF   CONFIDENTIAL INFORMATION    Dear Staff,    We are seeing Marj Lazar, date of birth 1965, in the clinic at [facility]. Kelly Garces MD is the patient's PCP. Marj Lazar has an outstanding lab/procedure at the time we reviewed her chart. In order to help keep her health information updated, she has authorized us to request the following medical record(s):        (  )  MAMMOGRAM                                      (  )  COLONOSCOPY      (xx  )  PAP SMEAR                                          (  )  OUTSIDE LAB RESULTS     (  )  DEXA SCAN                                          (  )  EYE EXAM            (  )  FOOT EXAM                                          (  )  ENTIRE RECORD     (  )  OUTSIDE IMMUNIZATIONS                 (  )  _______________         Please fax records to [facility], Bienvenu-Oubre, Shauna, [place],  [phone]  Attn: Sonia      If you have any questions, please contact Donna Maya (Connie)Care Coordinator @ [phone]     Patient Name: Marj Lazar  : 1965  Patient Phone #: [phone]     "

## 2023-10-19 ENCOUNTER — CLINICAL SUPPORT (OUTPATIENT)
Dept: REHABILITATION | Facility: HOSPITAL | Age: 58
End: 2023-10-19
Attending: ORTHOPAEDIC SURGERY
Payer: COMMERCIAL

## 2023-10-19 DIAGNOSIS — T14.8XXA MUSCLE TEAR: ICD-10-CM

## 2023-10-19 DIAGNOSIS — S50.12XA: Primary | ICD-10-CM

## 2023-10-19 DIAGNOSIS — M79.602 LEFT ARM PAIN: ICD-10-CM

## 2023-10-19 DIAGNOSIS — R29.898 LEFT HAND WEAKNESS: ICD-10-CM

## 2023-10-19 DIAGNOSIS — S51.812A LACERATION OF LEFT FOREARM, INITIAL ENCOUNTER: ICD-10-CM

## 2023-10-19 PROCEDURE — 97166 OT EVAL MOD COMPLEX 45 MIN: CPT

## 2023-10-19 RX ORDER — MELOXICAM 15 MG/1
15 TABLET ORAL DAILY PRN
Qty: 30 TABLET | Refills: 0 | Status: SHIPPED | OUTPATIENT
Start: 2023-10-19

## 2023-10-19 NOTE — PROGRESS NOTES
The following record(s)  below were uploaded for Health Maintenance .       PAP SMEAR  2022    HPV Screening 2022
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
## 2023-10-23 ENCOUNTER — CLINICAL SUPPORT (OUTPATIENT)
Dept: REHABILITATION | Facility: HOSPITAL | Age: 58
End: 2023-10-23
Attending: ORTHOPAEDIC SURGERY
Payer: COMMERCIAL

## 2023-10-23 DIAGNOSIS — S50.12XA CONTUSION OF ELBOW AND FOREARM, LEFT, INITIAL ENCOUNTER: ICD-10-CM

## 2023-10-23 DIAGNOSIS — R29.898 LEFT HAND WEAKNESS: ICD-10-CM

## 2023-10-23 DIAGNOSIS — M79.602 LEFT ARM PAIN: Primary | ICD-10-CM

## 2023-10-23 PROCEDURE — 97110 THERAPEUTIC EXERCISES: CPT

## 2023-10-23 NOTE — PROGRESS NOTES
"[facility]  Occupational Therapy Treatment Note     Date: 10/23/2023  Name: Marj Lazar  Clinic Number: [id number]    Therapy Diagnosis:   Encounter Diagnoses   Name Primary?    Left arm pain Yes    Contusion of elbow and forearm, left, initial encounter     Left hand weakness      Physician: Oscar Chapa MD    Physician Orders: Eval and Treat  Medical Diagnosis: S50.12XA-- Contusion, forearm and elbow, left, initial encounter   Evaluation Date: 10/19/2023  Insurance Authorization Period Expiration: 11/29/23  Plan of Care Certification Period: 11/29/23  Date of Return to MD: 11/1/23  Visit # / Visits authorized: 1 / 12  FOTO: 1/ 3        Time In: 1100  Time Out: 1130  Total Billable Time: 30 minutes      Subjective     Patient reports: wearing her sling as a protective mechanism and does take it off throughout the day to allow the arm to maintain Range of Motion. During AROM with the forearm Patient reported shooting pain to dorsal side of forearm.   She was compliant with home exercise program given last session.   Response to previous treatment:first visit since evaluation   Functional change: no changes to be noted at this time     Pain: 4/10  Location: left hands      Objective     Objective Measures updated at progress report unless specified.    Treatment     Marj received the treatments listed below:      therapeutic exercises to develop strength, endurance, and ROM for 30 minutes including:  - 3x 10 reps of L active elbow flexion to full extension  - 2x 10 reps of forearm supination to pronation.  - 3x 10 reps of L active wrist extension  -1x 10 reps of Tendon glides:   - Series A:   - begin with hand positioned upright, wrist straight with fully extended digits  - bend the IP joints to "hook" down, resulting with knuckles pointing up  - complete series in tight fist with thumb over your fingers   Series B:   - begin with hand positioned upright, wrist straight with fully " "extended digits  - bend the MCP joint to create a "tabletop" with your fingers, keeping the IP joints straight  - bend the fingers at the PIP joint, bringing the finger tips to the palm        Patient Education and Home Exercises     Education provided:   - Occupational Therapist provided Patient with copy of tendon glides to complete twice daily.   Occupational Therapist also educated Patient on the possible reason for shooting pain with the tendons being overstretched and not resting in their proper positioning therefore causing entrapment and possible pinching with certain movements.  - Progress towards goals      Assessment     Patient tolerated session well. Patient required minimal assistance/cues to complete tendon glides. Throughout session Patient required rest breaks secondary to muscle fatigue resulting in mild pain. Shooting pain only to be noted during forearm supination to pronation. At this time Patient does not tolerate significant reps due to the weakness of the distal arm.       Marj is progressing well towards her goals and there are no updates to goals at this time. Pt prognosis is Good.     Patient will continue to benefit from skilled outpatient occupational therapy to address the deficits listed in the problem list on initial evaluation provide patient/family education and to maximize patient's level of independence in the home and community environment.     Patient's spiritual, cultural and educational needs considered and patient agreeable to plan of care and goals.    Anticipated barriers to occupational therapy: no barriers to note at this time    Goals:   - Pt will increase L  strength to 20# or more to improve participation with ADL and IADL tasks.  - Pt will increase L key and 3 pt pinch strength by 3 psi to improve performance with cooking tasks and home management.   - Pt will decrease time on 9HPT by 10 seconds with left hand  to improve fine motor speed and coordination needed " to perform  home maintenance, grooming and hygiene  - Pt will decrease pain to 1/10 on average to increase functional participation with meaningful occupations and work related tasks.     Plan     Updates/Grading for next session: Continue with current plan of care     Saul Rodriguez OT   10/23/2023

## 2023-10-25 ENCOUNTER — CLINICAL SUPPORT (OUTPATIENT)
Dept: REHABILITATION | Facility: HOSPITAL | Age: 58
End: 2023-10-25
Attending: ORTHOPAEDIC SURGERY
Payer: COMMERCIAL

## 2023-10-25 DIAGNOSIS — S50.12XA CONTUSION OF ELBOW AND FOREARM, LEFT, INITIAL ENCOUNTER: ICD-10-CM

## 2023-10-25 DIAGNOSIS — R29.898 LEFT HAND WEAKNESS: ICD-10-CM

## 2023-10-25 DIAGNOSIS — M79.602 LEFT ARM PAIN: Primary | ICD-10-CM

## 2023-10-25 PROCEDURE — 97110 THERAPEUTIC EXERCISES: CPT

## 2023-10-25 NOTE — PROGRESS NOTES
"[facility]  Occupational Therapy Treatment Note     Date: 10/25/2023  Name: Marj Lazar  Clinic Number: [id number]    Therapy Diagnosis:   Encounter Diagnoses   Name Primary?    Left arm pain Yes    Contusion of elbow and forearm, left, initial encounter     Left hand weakness      Physician: Oscar Chapa MD    Physician Orders: Eval and Treat  Medical Diagnosis: S50.12XA-- Contusion, forearm and elbow, left, initial encounter   Evaluation Date: 10/19/2023  Insurance Authorization Period Expiration: 11/29/23  Plan of Care Certification Period: 11/29/23  Date of Return to MD: 11/1/23  Visit # / Visits authorized: 2 / 12  FOTO: 1/ 3        Time In: 1100  Time Out: 1130  Total Billable Time: 30 minutes      Subjective     Patient reports: soreness with some nerve pain following Monday session. Patient reports she attempted to  things with her L hand yesterday however dropped a can with attempt. Marj reports wanting to be able to stuff a turkey roll by thanksgiving with significant challenge to attempt effortful pulling now as needed to debone the turkey.    She was compliant with home exercise program given last session.   Response to previous treatment:first visit since evaluation   Functional change: no changes to be noted at this time     Pain: 4/10  Location: left hands      Objective     Objective Measures updated at progress report unless specified.    Treatment     Marj received the treatments listed below:      therapeutic exercises to develop strength, endurance, and ROM for 30 minutes including:  - 3x 10 reps of L active elbow flexion to full extension  - 25 reps of forearm supination to pronation.  - 2x 10 reps of L active wrist extension  -1x 10 reps of Tendon glides:   - Series A:   - begin with hand positioned upright, wrist straight with fully extended digits  - bend the IP joints to "hook" down, resulting with knuckles pointing up  - complete series in tight " "fist with thumb over your fingers   Series B:   - begin with hand positioned upright, wrist straight with fully extended digits  - bend the MCP joint to create a "tabletop" with your fingers, keeping the IP joints straight  - bend the fingers at the PIP joint, bringing the finger tips to the palm  - Desensitization attempted with soft cotton, pillow case, and dry wash rag. ~20 seconds of pillow case tolerated prior to Patient reporting discomfort.         Patient Education and Home Exercises     Education provided:   - Occupational Therapist educated Patient on desensitization techniques to complete at home on the radial side of forearm.   - Progress towards goals      Assessment     Patient tolerated session well. Patient did show improvements with tolerating increased repetitions for all exercises. During wrist extension, Patient reported the beginnings of shooting pain with repetition, Occupational Therapist provided stability on the dorsal side of wrist during active movement with Patient reporting relief from shooting. Poor tolerance for desensitization at this time.     Marj is progressing well towards her goals and there are no updates to goals at this time. Pt prognosis is Good.     Patient will continue to benefit from skilled outpatient occupational therapy to address the deficits listed in the problem list on initial evaluation provide patient/family education and to maximize patient's level of independence in the home and community environment.     Patient's spiritual, cultural and educational needs considered and patient agreeable to plan of care and goals.    Anticipated barriers to occupational therapy: no barriers to note at this time    Goals:   - Pt will increase L  strength to 20# or more to improve participation with ADL and IADL tasks.  - Pt will increase L key and 3 pt pinch strength by 3 psi to improve performance with cooking tasks and home management.   - Pt will decrease time on 9HPT " by 10 seconds with left hand  to improve fine motor speed and coordination needed to perform  home maintenance, grooming and hygiene  - Pt will decrease pain to 1/10 on average to increase functional participation with meaningful occupations and work related tasks.     Plan     Updates/Grading for next session: Continue with current plan of care     Saul Rodriguez OT   10/25/2023

## 2023-10-31 ENCOUNTER — CLINICAL SUPPORT (OUTPATIENT)
Dept: REHABILITATION | Facility: HOSPITAL | Age: 58
End: 2023-10-31
Attending: ORTHOPAEDIC SURGERY
Payer: COMMERCIAL

## 2023-10-31 ENCOUNTER — PATIENT OUTREACH (OUTPATIENT)
Dept: ADMINISTRATIVE | Facility: HOSPITAL | Age: 58
End: 2023-10-31
Payer: COMMERCIAL

## 2023-10-31 DIAGNOSIS — S50.12XA CONTUSION OF ELBOW AND FOREARM, LEFT, INITIAL ENCOUNTER: ICD-10-CM

## 2023-10-31 DIAGNOSIS — M79.602 LEFT ARM PAIN: Primary | ICD-10-CM

## 2023-10-31 DIAGNOSIS — R29.898 LEFT HAND WEAKNESS: ICD-10-CM

## 2023-10-31 PROCEDURE — 97110 THERAPEUTIC EXERCISES: CPT

## 2023-10-31 NOTE — PROGRESS NOTES
"[facility]  Occupational Therapy Treatment Note     Date: 10/31/2023  Name: Marj Lazar  Clinic Number: [id number]    Therapy Diagnosis:   Encounter Diagnoses   Name Primary?    Left arm pain Yes    Contusion of elbow and forearm, left, initial encounter     Left hand weakness      Physician: Oscar Chapa MD    Physician Orders: Eval and Treat  Medical Diagnosis: S50.12XA-- Contusion, forearm and elbow, left, initial encounter   Evaluation Date: 10/19/2023  Insurance Authorization Period Expiration: 11/29/23  Plan of Care Certification Period: 11/29/23  Date of Return to MD: 11/1/23  Visit # / Visits authorized: 3 / 12  FOTO: 1/ 3        Time In: 0930  Time Out: 1000  Total Billable Time: 30 minutes      Subjective     Patient reports: most challenging activity at home is holding onto light items such as cups without significant weakness and poor endurance resulting in her needing to drop the item.    She was compliant with home exercise program given last session.   Response to previous treatment:good    Functional change: no changes to be noted at this time     Pain: 4/10  Location: left hands      Objective     Objective Measures updated at progress report unless specified.    Treatment     Marj received the treatments listed below:      therapeutic exercises to develop strength, endurance, and ROM for 30 minutes including:  - 3x 10 reps of L active elbow flexion to full extension maintain grasp around cone while completing movement   - 25 reps of forearm supination to pronation.  - 3x 10 reps of L active wrist extension with cone in hand  -2x 10 reps of Tendon glides:   - Series A:   - begin with hand positioned upright, wrist straight with fully extended digits  - bend the IP joints to "hook" down, resulting with knuckles pointing up  - complete series in tight fist with thumb over your fingers   Series B:   - begin with hand positioned upright, wrist straight with fully " "extended digits  - bend the MCP joint to create a "tabletop" with your fingers, keeping the IP joints straight  - bend the fingers at the PIP joint, bringing the finger tips to the palm  - 3x 10 reps of finger abduction using black rubberband for increased resistance.       Patient Education and Home Exercises     Education provided:   - Progress towards goals      Assessment     Patient tolerated session well. With upgrade to maintain grasp around cone during active Range of Motion, Patient reported fatigue in hand when competing the exercise. Patient did tolerate increased reps for all exercises well.     Marj is progressing well towards her goals and there are no updates to goals at this time. Pt prognosis is Good.     Patient will continue to benefit from skilled outpatient occupational therapy to address the deficits listed in the problem list on initial evaluation provide patient/family education and to maximize patient's level of independence in the home and community environment.     Patient's spiritual, cultural and educational needs considered and patient agreeable to plan of care and goals.    Anticipated barriers to occupational therapy: no barriers to note at this time    Goals:   - Pt will increase L  strength to 20# or more to improve participation with ADL and IADL tasks.  - Pt will increase L key and 3 pt pinch strength by 3 psi to improve performance with cooking tasks and home management.   - Pt will decrease time on 9HPT by 10 seconds with left hand  to improve fine motor speed and coordination needed to perform  home maintenance, grooming and hygiene  - Pt will decrease pain to 1/10 on average to increase functional participation with meaningful occupations and work related tasks.     Plan     Updates/Grading for next session: Continue with current plan of care     Saul Rodriguez OT   10/31/2023    "

## 2023-10-31 NOTE — LETTER
"  This communication is flagged as high priority.        AUTHORIZATION FOR RELEASE OF   CONFIDENTIAL INFORMATION    Dear Staff [facility],    We are seeing Marj Lazar, date of birth 1965, in the clinic at [facility]. Kelly Garces MD is the patient's PCP. Marj Lazar has an outstanding lab/procedure at the time we reviewed her chart. In order to help keep her health information updated, she has authorized us to request the following medical record(s):                                         (xx  )  MAMMOGRAM  ,                                             Please fax records to [facility], Bienvenu-O[place]re, Shauna, MD,  [phone]  Attn: Sonia       If you have any questions, please contact Donna Paez" [facility] Coordinator @ [phone]       Patient Name: Marj Lazar  : 1965  Patient Phone #: [phone]     "

## 2023-10-31 NOTE — PROGRESS NOTES
Population Health Outreach.  Requested Mammogram [facility] 2022,2023  A1c lab  pending

## 2023-11-01 ENCOUNTER — OFFICE VISIT (OUTPATIENT)
Dept: ORTHOPEDICS | Facility: CLINIC | Age: 58
End: 2023-11-01
Payer: COMMERCIAL

## 2023-11-01 ENCOUNTER — PATIENT OUTREACH (OUTPATIENT)
Dept: ADMINISTRATIVE | Facility: HOSPITAL | Age: 58
End: 2023-11-01
Payer: COMMERCIAL

## 2023-11-01 VITALS — TEMPERATURE: 98 F | HEART RATE: 86 BPM | DIASTOLIC BLOOD PRESSURE: 83 MMHG | SYSTOLIC BLOOD PRESSURE: 139 MMHG

## 2023-11-01 DIAGNOSIS — S50.12XA: Primary | ICD-10-CM

## 2023-11-01 DIAGNOSIS — G56.02 LEFT CARPAL TUNNEL SYNDROME: ICD-10-CM

## 2023-11-01 PROCEDURE — 99214 OFFICE O/P EST MOD 30 MIN: CPT | Mod: ,,, | Performed by: ORTHOPAEDIC SURGERY

## 2023-11-01 PROCEDURE — 99214 PR OFFICE/OUTPT VISIT, EST, LEVL IV, 30-39 MIN: ICD-10-PCS | Mod: ,,, | Performed by: ORTHOPAEDIC SURGERY

## 2023-11-01 NOTE — PROGRESS NOTES
The following record(s)  below were uploaded for Health Maintenance .    MAMMOGRAM SCREENING    2022

## 2023-11-01 NOTE — PROGRESS NOTES
Subjective:    CC: Injury and Pain of the Left Forearm (Still having pain from recent injury, numbness and tingling )       HPI:  Patient returns today for repeat exam.  She is presently with family.  Patient is appropriately 6 weeks from her initial injury.  She states she is slowly improving though still has pain and discomfort.  She has been going to physical therapy, she states they have made her a splint as well.  She is taking some anti-inflammatories.  Patient has noticed some numbness and tingling in her forearm down into her fingers.  She states this started about 3 weeks ago.    ROS: Refer to HPI for pertinent ROS. All other 12 point systems negative.    Objective:  Vitals:    11/01/23 1428   BP: 139/83   Pulse: 86   Temp: 97.5 °F (36.4 °C)        Physical Exam:  Left upper extremity compartment soft and warm.  Skin is intact.  There is no signs symptoms of DVT or infection.  Her previous skin lacerations are healing nicely.  There is no signs of infection.  She is appropriately, generalized tenderness along the volar and dorsal aspect of the forearm.  She does have questionable numbness and tingling in all 5 fingers.  He is no obvious thenar hypothenar wasting.  She is neurovascular intact distally.    Images: . Images Reviewed and discussed with patient.    Assessment:  1. Contusion, forearm and elbow, left, initial encounter    2. Left carpal tunnel syndrome        Plan:  At this time we discussed her physical exam and previous imaging findings.  We have discussed a nerve injury to her forearm, possible carpal tunnel as well.  We will proceed with a nerve conduction study for further evaluation of her suspected nerve injury.  I would like see back in 2 week with her results.  She will continue with her bracing, continue physical therapy.    Follow UP: No follow-ups on file.

## 2023-11-01 NOTE — LETTER
[facility]  [address] [place] [address]. [address], [address]  Phone: [phone]  Fax: [phone]    Name:Marj Lazar  :1965   Date:2023     PATIENT IS UNABLE TO WORK AS OF: 2023  [_] Pending treatment.  [x] For approximately [_] Days [3]Weeks [_] Months  [_] Pending diagnostic testing.  [_] Pending surgical treatment.  [_] For approximately _ months (Post Surgery)    PATIENT IS ABLE TO RETURN TO WORK AS OF:re-eval at next appt on 23    COMMENTS     Oscar Chapa MD / Magy Betts PA-C

## 2023-11-02 ENCOUNTER — OFFICE VISIT (OUTPATIENT)
Dept: FAMILY MEDICINE | Facility: CLINIC | Age: 58
End: 2023-11-02
Payer: COMMERCIAL

## 2023-11-02 ENCOUNTER — CLINICAL SUPPORT (OUTPATIENT)
Dept: REHABILITATION | Facility: HOSPITAL | Age: 58
End: 2023-11-02
Attending: ORTHOPAEDIC SURGERY
Payer: COMMERCIAL

## 2023-11-02 VITALS
HEIGHT: 64 IN | HEART RATE: 76 BPM | OXYGEN SATURATION: 98 % | RESPIRATION RATE: 18 BRPM | SYSTOLIC BLOOD PRESSURE: 128 MMHG | WEIGHT: 150.81 LBS | TEMPERATURE: 98 F | BODY MASS INDEX: 25.75 KG/M2 | DIASTOLIC BLOOD PRESSURE: 78 MMHG

## 2023-11-02 DIAGNOSIS — M79.602 LEFT ARM PAIN: Primary | ICD-10-CM

## 2023-11-02 DIAGNOSIS — S50.12XA CONTUSION OF ELBOW AND FOREARM, LEFT, INITIAL ENCOUNTER: ICD-10-CM

## 2023-11-02 DIAGNOSIS — I10 PRIMARY HYPERTENSION: Primary | ICD-10-CM

## 2023-11-02 DIAGNOSIS — R29.898 LEFT HAND WEAKNESS: ICD-10-CM

## 2023-11-02 PROCEDURE — 1159F MED LIST DOCD IN RCRD: CPT | Mod: CPTII,,, | Performed by: FAMILY MEDICINE

## 2023-11-02 PROCEDURE — 3074F SYST BP LT 130 MM HG: CPT | Mod: CPTII,,, | Performed by: FAMILY MEDICINE

## 2023-11-02 PROCEDURE — 99213 OFFICE O/P EST LOW 20 MIN: CPT | Mod: ,,, | Performed by: FAMILY MEDICINE

## 2023-11-02 PROCEDURE — 3008F PR BODY MASS INDEX (BMI) DOCUMENTED: ICD-10-PCS | Mod: CPTII,,, | Performed by: FAMILY MEDICINE

## 2023-11-02 PROCEDURE — 3008F BODY MASS INDEX DOCD: CPT | Mod: CPTII,,, | Performed by: FAMILY MEDICINE

## 2023-11-02 PROCEDURE — 99213 PR OFFICE/OUTPT VISIT, EST, LEVL III, 20-29 MIN: ICD-10-PCS | Mod: ,,, | Performed by: FAMILY MEDICINE

## 2023-11-02 PROCEDURE — 3074F PR MOST RECENT SYSTOLIC BLOOD PRESSURE < 130 MM HG: ICD-10-PCS | Mod: CPTII,,, | Performed by: FAMILY MEDICINE

## 2023-11-02 PROCEDURE — 1160F RVW MEDS BY RX/DR IN RCRD: CPT | Mod: CPTII,,, | Performed by: FAMILY MEDICINE

## 2023-11-02 PROCEDURE — 3078F DIAST BP <80 MM HG: CPT | Mod: CPTII,,, | Performed by: FAMILY MEDICINE

## 2023-11-02 PROCEDURE — 1160F PR REVIEW ALL MEDS BY PRESCRIBER/CLIN PHARMACIST DOCUMENTED: ICD-10-PCS | Mod: CPTII,,, | Performed by: FAMILY MEDICINE

## 2023-11-02 PROCEDURE — 3078F PR MOST RECENT DIASTOLIC BLOOD PRESSURE < 80 MM HG: ICD-10-PCS | Mod: CPTII,,, | Performed by: FAMILY MEDICINE

## 2023-11-02 PROCEDURE — 97110 THERAPEUTIC EXERCISES: CPT

## 2023-11-02 PROCEDURE — 1159F PR MEDICATION LIST DOCUMENTED IN MEDICAL RECORD: ICD-10-PCS | Mod: CPTII,,, | Performed by: FAMILY MEDICINE

## 2023-11-02 NOTE — PROGRESS NOTES
[facility]  Occupational Therapy Treatment Note     Date: 11/2/2023  Name: Marj Lazar  Clinic Number: [id number]    Therapy Diagnosis:   Encounter Diagnoses   Name Primary?    Left arm pain Yes    Contusion of elbow and forearm, left, initial encounter     Left hand weakness      Physician: Oscar Chapa MD    Physician Orders: Eval and Treat  Medical Diagnosis: S50.12XA-- Contusion, forearm and elbow, left, initial encounter   Evaluation Date: 10/19/2023  Insurance Authorization Period Expiration: 11/29/23  Plan of Care Certification Period: 11/29/23  Date of Return to MD: 11/1/23  Visit # / Visits authorized: 4 / 12  FOTO: 2/ 3        Time In: 0930  Time Out: 1000  Total Billable Time: 30 minutes      Subjective     Patient reports: seeing Dr. Chapa yesterday who referred her to a Neurologist to allow clarification on possible nerve damage. Marj also reports receiving a text from [employer] rep to confirm receiving prescription from Occupational Therapy for her Nex wave TENS unit.   She was compliant with home exercise program given last session.   Response to previous treatment:good    Functional change: no changes to be noted at this time     Pain: 3/10  Location: left hands      Objective        Intake Outcome Measure for FOTO Hand Survey     Therapist reviewed FOTO scores for Marj Lazar on 10/19/2023.   FOTO report - see Media section or FOTO account episode details.     Intake Score (10/19): 90.0%  Intake Score (11/2): 93.3%              Treatment     Marj received the treatments listed below:      therapeutic exercises to develop strength, endurance, and ROM for 30 minutes including:  - Patient completed FOTO survey   - 3x 10 reps of L active elbow flexion to full extension maintain grasp around cone while completing movement   - 3x 10 reps of L active wrist extension with cone in hand having arm placed on wedge to allow isolation of the wrist for increased extension and  flexion   - 3x 10 reps of L gross grasp using red resistive foam block       Patient Education and Home Exercises     Education provided:   - Progress towards goals      Assessment     Patient tolerated session well. With hand placed on wedge, Patient reported better Range of Motion with decreased pain.      Marj is progressing well towards her goals and there are no updates to goals at this time. Pt prognosis is Good.     Patient will continue to benefit from skilled outpatient occupational therapy to address the deficits listed in the problem list on initial evaluation provide patient/family education and to maximize patient's level of independence in the home and community environment.     Patient's spiritual, cultural and educational needs considered and patient agreeable to plan of care and goals.    Anticipated barriers to occupational therapy: no barriers to note at this time    Goals:   - Pt will increase L  strength to 20# or more to improve participation with ADL and IADL tasks.  - Pt will increase L key and 3 pt pinch strength by 3 psi to improve performance with cooking tasks and home management.   - Pt will decrease time on 9HPT by 10 seconds with left hand  to improve fine motor speed and coordination needed to perform  home maintenance, grooming and hygiene  - Pt will decrease pain to 1/10 on average to increase functional participation with meaningful occupations and work related tasks.     Plan     Updates/Grading for next session: Continue with current plan of care     Saul Rodriguez OT   11/2/2023

## 2023-11-02 NOTE — PROGRESS NOTES
Marj Lazar  11/02/2023  [id number]    Kelly Garces MD  Patient Care Team:  Kelly Garces MD as PCP - General (Family Medicine)      Chief Complaint:  Chief Complaint   Patient presents with    Follow-up     2 week f/u for HTN       History of Present Illness:    58 y.o. female who presents today for HTN f/u. NO concerns or complaints.     Review of Systems  General: denies f/c, weight loss, night sweats, decreased appetite  Eye: denies blurred vision, changes in vision  Respiratory: denies sob, wheezing, cough  Cardiovascular: denies chest pain, palpitations, edema  Gastrointestinal: denies abdominal pain, n/v, constipation, diarrhea  Integumentary: denies rashes, pruritis    Past Medical History  Past Medical History:   Diagnosis Date    Hyperlipidemia     Hypertension        Medications  Medication List with Changes/Refills   Current Medications    ATORVASTATIN (LIPITOR) 20 MG TABLET    Take 1 tablet (20 mg total) by mouth once daily.    ERGOCALCIFEROL (ERGOCALCIFEROL) 50,000 UNIT CAP    Take 50,000 Units by mouth every 7 days.    KETOCONAZOLE (NIZORAL) 2 % CREAM    SMARTSIG:sparingly Topical Twice Daily    KETOCONAZOLE (NIZORAL) 2 % SHAMPOO    Apply topically 3 (three) times a week.    LOSARTAN-HYDROCHLOROTHIAZIDE 100-25 MG (HYZAAR) 100-25 MG PER TABLET    Take 1 tablet by mouth once daily.    MELOXICAM (MOBIC) 15 MG TABLET    TAKE 1 TABLET BY MOUTH EVERY DAY AS NEEDED FOR PAIN    PILOCARPINE HCL (VUITY) 1.25 % DROP    INSTILL 1 DROP IN BOTH EYES DAILY   Discontinued Medications    TRAMADOL (ULTRAM) 50 MG TABLET    Take 1 tablet (50 mg total) by mouth every 6 (six) hours as needed for Pain.       Past Surgical History:   Procedure Laterality Date    TONSILLECTOMY  1976    TUBAL LIGATION         SUBJECTIVE:  Health Maintenance  Health Maintenance Topics with due status: Not Due       Topic Last Completion Date    TETANUS VACCINE 11/17/2017    Colorectal Cancer Screening 12/29/2021    Lipid  "Panel 08/24/2022    Cervical Cancer Screening 10/26/2022    Mammogram 09/18/2023     Health Maintenance Due   Topic Date Due    HIV Screening  Never done    Hemoglobin A1c (Diabetic Prevention Screening)  Never done    Shingles Vaccine (2 of 2) 11/28/2022    RSV Vaccine (Age 60+) (1 - 1-dose 60+ series) Never done    Influenza Vaccine (1) 09/01/2023    COVID-19 Vaccine (3 - 2023-24 season) 09/01/2023       Exam:  Vitals:    11/02/23 1404   BP: 128/78   BP Location: Right arm   Patient Position: Sitting   BP Method: Small (Manual)   Pulse: 76   Resp: 18   Temp: 97.9 °F (36.6 °C)   TempSrc: Oral   SpO2: 98%   Weight: 68.4 kg (150 lb 12.8 oz)   Height: 5' 4" (1.626 m)     Weight: 68.4 kg (150 lb 12.8 oz)   Body mass index is 25.88 kg/m².      Physical Exam  Constitutional: NAD, alert, pleasant  Respiratory: CTAB, no wheezes, rales or rhonchi. No accessory muscle use  Eyes: EOMI  Cardiovascular: RRR, No m/r/g. No JVD. No LE edema  Integumentary: warm, dry, intact  Psych: AA&Ox3      ICD-10-CM ICD-9-CM   1. Primary hypertension  I10 401.9       1. Primary hypertension  Overview:  At goal on current meds. Asymptomatic    Continue current Rx meds             Follow up: Follow up for early april with fasting labs for htn.      Care Plan/Goals: Reviewed   Goals    None               "

## 2023-11-07 ENCOUNTER — CLINICAL SUPPORT (OUTPATIENT)
Dept: REHABILITATION | Facility: HOSPITAL | Age: 58
End: 2023-11-07
Payer: COMMERCIAL

## 2023-11-07 DIAGNOSIS — R29.898 LEFT HAND WEAKNESS: ICD-10-CM

## 2023-11-07 DIAGNOSIS — S50.12XA CONTUSION OF ELBOW AND FOREARM, LEFT, INITIAL ENCOUNTER: ICD-10-CM

## 2023-11-07 DIAGNOSIS — M79.602 LEFT ARM PAIN: Primary | ICD-10-CM

## 2023-11-07 PROCEDURE — 97110 THERAPEUTIC EXERCISES: CPT

## 2023-11-07 NOTE — PROGRESS NOTES
[facility]  Occupational Therapy Treatment Note     Date: 11/7/2023  Name: Marj Lazar  Clinic Number: [id number]    Therapy Diagnosis:   Encounter Diagnoses   Name Primary?    Left arm pain Yes    Contusion of elbow and forearm, left, initial encounter     Left hand weakness      Physician: Oscar Chapa MD    Physician Orders: Eval and Treat  Medical Diagnosis: S50.12XA-- Contusion, forearm and elbow, left, initial encounter   Evaluation Date: 10/19/2023  Insurance Authorization Period Expiration: 11/29/23  Plan of Care Certification Period: 11/29/23  Date of Return to MD: waiting to schedule with neurologist   Visit # / Visits authorized: 5 / 12  FOTO: 2/ 3        Time In: 0930  Time Out: 1000  Total Billable Time: 30 minutes      Subjective     Patient reports: feeling muscle fatigue throughout hand and forearm over the weekend following functional use with attempt to grasp, reach and complete other like movements with the Left upper extremity. Marj reports the MD reporting possible carpal tunnel involvement. Occupational Therapist encouraged Patient that her HEP and stretching is following a Carpal Tunnel protocol therefore if she should be formally diagnosed her plan of care is already following good measures.   She was compliant with home exercise program given last session.   Response to previous treatment:good    Functional change: no changes to be noted at this time     Pain: 3/10  Location: left hands      Objective        Intake Outcome Measure for FOTO Hand Survey     Therapist reviewed FOTO scores for Marj Lazar on 10/19/2023.   FOTO report - see Media section or FOTO account episode details.     Intake Score (10/19): 90.0%  Intake Score (11/2): 93.3%              Treatment     Marj received the treatments listed below:      therapeutic exercises to develop strength, endurance, and ROM for 30 minutes including:  - 3x 10 reps of L active elbow flexion to full  "extension maintain grasp around cone while completing movement   - 3x 10 reps of L forearm supination/pronation rotation with cone maintained in hand   - 3x 10 reps of L active wrist extension with cone in hand   - 2x 10 of tendon glides as listed below:   Series A:   - begin with hand positioned upright, wrist straight with fully extended digits  - bend the IP joints to "hook" down, resulting with knuckles pointing up  - complete series in tight fist with thumb over your fingers   Series B:   - begin with hand positioned upright, wrist straight with fully extended digits  - bend the MCP joint to create a "tabletop" with your fingers, keeping the IP joints straight  - bend the fingers at the PIP joint, bringing the finger tips to the palm  - Using only the L hand, Patient removed large pegs from foam board with resistance to pulling.   - 3x 30 seconds of desensitization completed at scar site using dry, rag.         Patient Education and Home Exercises     Education provided:   - Progress towards goals      Assessment     Patient tolerated session well. Patient reported irritation with rag, however nothing too severe with the heightened irritation decreasing over time. No reports of shooting pain throughout session with Patient acknowledging muscle weakness with mild aching during each exercise.      Marj is progressing well towards her goals and there are no updates to goals at this time. Pt prognosis is Good.     Patient will continue to benefit from skilled outpatient occupational therapy to address the deficits listed in the problem list on initial evaluation provide patient/family education and to maximize patient's level of independence in the home and community environment.     Patient's spiritual, cultural and educational needs considered and patient agreeable to plan of care and goals.    Anticipated barriers to occupational therapy: no barriers to note at this time    Goals:   - Pt will increase L  " strength to 20# or more to improve participation with ADL and IADL tasks.  - Pt will increase L key and 3 pt pinch strength by 3 psi to improve performance with cooking tasks and home management.   - Pt will decrease time on 9HPT by 10 seconds with left hand  to improve fine motor speed and coordination needed to perform  home maintenance, grooming and hygiene  - Pt will decrease pain to 1/10 on average to increase functional participation with meaningful occupations and work related tasks.     Plan     Updates/Grading for next session: Continue with current plan of care     Saul Rodriguez OT   11/7/2023

## 2023-11-09 ENCOUNTER — CLINICAL SUPPORT (OUTPATIENT)
Dept: REHABILITATION | Facility: HOSPITAL | Age: 58
End: 2023-11-09
Payer: COMMERCIAL

## 2023-11-09 DIAGNOSIS — R29.898 LEFT HAND WEAKNESS: ICD-10-CM

## 2023-11-09 DIAGNOSIS — S50.12XA CONTUSION OF ELBOW AND FOREARM, LEFT, INITIAL ENCOUNTER: ICD-10-CM

## 2023-11-09 DIAGNOSIS — M79.602 LEFT ARM PAIN: Primary | ICD-10-CM

## 2023-11-09 PROCEDURE — 97110 THERAPEUTIC EXERCISES: CPT

## 2023-11-09 NOTE — PROGRESS NOTES
[facility]  Occupational Therapy Treatment Note     Date: 11/9/2023  Name: Marj Lazar  Clinic Number: [id number]    Therapy Diagnosis:   Encounter Diagnoses   Name Primary?    Left arm pain Yes    Contusion of elbow and forearm, left, initial encounter     Left hand weakness      Physician: Oscar Chapa MD    Physician Orders: Eval and Treat  Medical Diagnosis: S50.12XA-- Contusion, forearm and elbow, left, initial encounter   Evaluation Date: 10/19/2023  Insurance Authorization Period Expiration: 11/29/23  Plan of Care Certification Period: 11/29/23  Date of Return to MD: N/A  Visit # / Visits authorized: 6 / 12  FOTO: 2/ 3        Time In: 0930  Time Out: 1000  Total Billable Time: 30 minutes      Subjective     Patient reports: seeing neurologist yesterday whom ran a nerve conduction study. Patient is unaware of the results at this time, however reports her pain does radiate to her cervical spine at night which the MD raised possible concern about.   She was compliant with home exercise program given last session.   Response to previous treatment:good    Functional change: no changes to be noted at this time     Pain: 3/10  Location: left hands      Objective        Intake Outcome Measure for FOTO Hand Survey     Therapist reviewed FOTO scores for Marj Lazar on 11/2/2023.   FOTO report - see Media section or FOTO account episode details.     Intake Score (10/19): 90.0%  Intake Score (11/2): 93.3%              Treatment     Marj received the treatments listed below:      therapeutic exercises to develop strength, endurance, and ROM for 30 minutes including:  - 3x 10 reps of L active elbow flexion to full extension using 1# dumbbell  - 3x 10 reps of L forearm supination/pronation rotation using 1# dumbbell   - 3x 10 reps of L active wrist extension using 1# dumbbell   - 3x 10 reps of L gross grasp using red resistive foam block   - 3x 10 reps of L key pinch using red resistive  foam block       Patient Education and Home Exercises     Education provided:   - Progress towards goals      Assessment     Patient tolerated session well. All upgrades were tolerated well with Patient taking rest breaks as needed within sets. During key pinch, Patient reported increased shooting pain that radiated to her neck once done with the exercise. Occupational Therapist and Patient discussed trying nerve flossing next week to target possible neck pain.     Marj is progressing well towards her goals and there are no updates to goals at this time. Pt prognosis is Good.     Patient will continue to benefit from skilled outpatient occupational therapy to address the deficits listed in the problem list on initial evaluation provide patient/family education and to maximize patient's level of independence in the home and community environment.     Patient's spiritual, cultural and educational needs considered and patient agreeable to plan of care and goals.    Anticipated barriers to occupational therapy: no barriers to note at this time    Goals:   - Pt will increase L  strength to 20# or more to improve participation with ADL and IADL tasks.  - Pt will increase L key and 3 pt pinch strength by 3 psi to improve performance with cooking tasks and home management.   - Pt will decrease time on 9HPT by 10 seconds with left hand  to improve fine motor speed and coordination needed to perform  home maintenance, grooming and hygiene  - Pt will decrease pain to 1/10 on average to increase functional participation with meaningful occupations and work related tasks.     Plan     Updates/Grading for next session: Continue with current plan of care     Saul Rodriguez OT   11/9/2023

## 2023-11-14 ENCOUNTER — CLINICAL SUPPORT (OUTPATIENT)
Dept: REHABILITATION | Facility: HOSPITAL | Age: 58
End: 2023-11-14
Payer: COMMERCIAL

## 2023-11-14 DIAGNOSIS — S50.12XA CONTUSION OF ELBOW AND FOREARM, LEFT, INITIAL ENCOUNTER: ICD-10-CM

## 2023-11-14 DIAGNOSIS — M79.602 LEFT ARM PAIN: Primary | ICD-10-CM

## 2023-11-14 DIAGNOSIS — R29.898 LEFT HAND WEAKNESS: ICD-10-CM

## 2023-11-14 PROCEDURE — 97110 THERAPEUTIC EXERCISES: CPT

## 2023-11-14 NOTE — PROGRESS NOTES
"[facility] OUTPATIENT THERAPY AND WELLNESS  Occupational Therapy Treatment Note     Date: 11/14/2023  Name: Marj Lazar  Clinic Number: [id number]    Therapy Diagnosis:   Encounter Diagnoses   Name Primary?    Left arm pain Yes    Contusion of elbow and forearm, left, initial encounter     Left hand weakness      Physician: Oscar Chapa MD    Physician Orders: Eval and Treat  Medical Diagnosis: S50.12XA-- Contusion, forearm and elbow, left, initial encounter   Evaluation Date: 10/19/2023  Insurance Authorization Period Expiration: 11/29/23  Plan of Care Certification Period: 11/29/23  Date of Return to MD: N/A  Visit # / Visits authorized: 7 / 12  FOTO: 2/ 3        Time In: 0930  Time Out: 1000  Total Billable Time: 30 minutes      Subjective     Patient reports: feeling "a little better." Continues to present with sling however reports she feels as if she does not need to wear it anymore as it does not provide increased relief.   She was compliant with home exercise program given last session.   Response to previous treatment:good    Functional change: no changes to be noted at this time     Pain: 2/10  Location: left hands      Objective        Intake Outcome Measure for FOTO Hand Survey     Therapist reviewed FOTO scores for Marj Lazar on 11/2/2023.   FOTO report - see Media section or FOTO account episode details.     Intake Score (10/19): 90.0%  Intake Score (11/2): 93.3%              Treatment     Marj received the treatments listed below:      therapeutic exercises to develop strength, endurance, and ROM for 30 minutes including:  - 3x 10 reps of L active elbow flexion to full extension using 1# dumbbell  - 3x 10 reps of L forearm supination/pronation rotation using 1# dumbbell   - 3x 10 reps of L active wrist extension using 1# dumbbell   - 3x 10 reps of L gross grasp using red resistive foam block   - Occupational Therapist and Patient went over new HEP for ulnar, median, and radial nerve glides. "       Patient Education and Home Exercises     Education provided:   - HEP for all nerve glides provided for Patient to complete daily.   - Progress towards goals      Assessment     Patient tolerated session well. During wrist extension Patient reports feeling some type of pain however it is difficult to explain secondary to not necessarily feeling a shooting pain. Patient demonstrated good understanding of HEP to complete daily.     Marj is progressing well towards her goals and there are no updates to goals at this time. Pt prognosis is Good.     Patient will continue to benefit from skilled outpatient occupational therapy to address the deficits listed in the problem list on initial evaluation provide patient/family education and to maximize patient's level of independence in the home and community environment.     Patient's spiritual, cultural and educational needs considered and patient agreeable to plan of care and goals.    Anticipated barriers to occupational therapy: no barriers to note at this time    Goals:   - Pt will increase L  strength to 20# or more to improve participation with ADL and IADL tasks.  - Pt will increase L key and 3 pt pinch strength by 3 psi to improve performance with cooking tasks and home management.   - Pt will decrease time on 9HPT by 10 seconds with left hand  to improve fine motor speed and coordination needed to perform  home maintenance, grooming and hygiene  - Pt will decrease pain to 1/10 on average to increase functional participation with meaningful occupations and work related tasks.     Plan     Updates/Grading for next session: Continue with current plan of care     Saul Rodriguez OT   11/14/2023

## 2023-11-15 ENCOUNTER — DOCUMENTATION ONLY (OUTPATIENT)
Dept: FAMILY MEDICINE | Facility: CLINIC | Age: 58
End: 2023-11-15
Payer: COMMERCIAL

## 2023-11-16 ENCOUNTER — CLINICAL SUPPORT (OUTPATIENT)
Dept: REHABILITATION | Facility: HOSPITAL | Age: 58
End: 2023-11-16
Payer: COMMERCIAL

## 2023-11-16 DIAGNOSIS — R29.898 LEFT HAND WEAKNESS: ICD-10-CM

## 2023-11-16 DIAGNOSIS — S50.12XA CONTUSION OF ELBOW AND FOREARM, LEFT, INITIAL ENCOUNTER: ICD-10-CM

## 2023-11-16 DIAGNOSIS — M79.602 LEFT ARM PAIN: Primary | ICD-10-CM

## 2023-11-16 PROCEDURE — 97110 THERAPEUTIC EXERCISES: CPT

## 2023-11-16 NOTE — PLAN OF CARE
[facility]PAT[facility]AP[facility] AND [facility]  Occupational Therapy Plan of Care Note     Name: Marj Lazar  [facility] Number: [id number]    Therapy Diagnosis:   Encounter Diagnoses   Name Primary?    Left arm pain Yes    Contusion of elbow and forearm, left, initial encounter     Left hand weakness      Physician: Oscar Chapa MD    Visit Date: 11/16/2023    Physician Orders: Eval and Treat   Medical Diagnosis from Referral: S50.12XA-- Contusion, forearm and elbow, left, initial encounter    Evaluation Date: 10/19/23  Authorization Period Expiration: 12/1/23   Plan of Care Expiration: 11/29/23  Progress Note Due: 11/16/23-- completed on this date    Visit # / Visits authorized: 8/ 12  FOTO: 3/3    Time In: 0930  Time Out: 1005  Total Billable Time: 35 minutes    Subjective     Update: Marj reports on average her pain is 5-6/10 pain throughout the bicep and forearm. Marj reports her description of pain as aching, dull, tingling, deep, numb, shooting and constant. Marj is able to lift her arms to her head in attempt to fix her hair however due to poor endurance her hands' begin to tremble almost immediately. Marj did attempt to not wear sling yesterday and presents with significant pain today.     Objective      Update:   Elbow and Wrist ROM. Measured in degrees.    10/19/2023 11/16/23     Left Left   Supination/Pronation Sup: 40  Pron: full Sup: 50 with stretch sensation   Wrist Ext/Flex Full with pain Flx: full with no pain  Ext: mild pull/pop with full              Strength (Dyanmometer) and Pinch Strength (Pinch Gauge)  Measured in pounds and psi. Average of three trials.    10/19/2023 10/19/2023 11/16/23     Right  Left  Left   Rung II 34.3# unable 2#   Key Pinch 4.2 psi .67 psi  1.2 psi   3pt Pinch 1.2 psi Unable  unable         Fine Motor Coordination: 9 Hole Peg Test  Right  10/19/23 Left    10/19/23 Left  11/16/23   22.04 sec 38.22 sec 52.65 sec            Intake Outcome Measure for FOTO Hand Survey      Therapist reviewed FOTO scores for Marj Lazar on 10/19/2023.   FOTO report - see Media section or FOTO account episode details.     Intake Score (10/19): 90.0%  Intake Score (11/2): 93.3%  Intake Score (11/16) 68.3%        Assessment     Update: Patient's reported functional outcome measure has significantly progress with a 25% increase in reported functional use of the L hand. Patient does present with progress for  strength and key pinch however continues to report pain with each assessment. Patient no longer presents with pain for full Range of Motion of wrist flexion, mild pain remains for wrist extension. Increased time is noted for fine motor coordination with pain reported on dorsal side of hand. Patient is scheduled to see Dr. Chapa on 11/22 to review her results from neurology appointment. Following this appointment, Patient and Occupational Therapist will discuss changes needed to be made in plan of care to allow increased strength and endurance with decreased pain. Patient is also planned to receive TENS unit from [employer] company to assist with pain at home.     Reasons for Recertification of Therapy:   Patient would benefit from continued Occupational Therapy services to decrease pain, increase strength, increase endurance, and increase functional use to improve independence with ADL, daily routines, and allow Patient to return to work. At this time Patient continues to suffer with severe pain and weakness with poor use of the L hand in her daily routines. Without continued Occupational Therapy services, Patient is at risk for increased pain, decreased strength, decreased endurance, decreased functional use, and possible contracture of the hand resulting in decreased independence for ADL, daily routines, poor return to work and decreased quality of life.     GOALS  - Pt will increase L  strength to 20# or more to improve participation with ADL and IADL tasks.--ongoing, progressing  - Pt will  increase L key and 3 pt pinch strength by 3 psi to improve performance with cooking tasks and home management.--ongoing, progressing   - Pt will decrease time on 9HPT by 10 seconds with left hand  to improve fine motor speed and coordination needed to perform  home maintenance, grooming and hygiene--ongoing, progressing  - Pt will decrease pain to 1/10 on average to increase functional participation with meaningful occupations and work related tasks --ongoing, progressing    Plan     Updated Certification Period: 11/16/23 to 12/1/23   Recommended Treatment Plan: 2 times per week for 8 weeks:  Electrical Stimulation to the L forearm, Neuromuscular Re-ed, Therapeutic Activities, and Therapeutic Exercise  Other Recommendations: Continue with current plan of care, with possible change in approach following neurology appointment.    Saul Rodriguez, OT

## 2023-11-20 ENCOUNTER — PATIENT MESSAGE (OUTPATIENT)
Dept: ORTHOPEDICS | Facility: CLINIC | Age: 58
End: 2023-11-20
Payer: COMMERCIAL

## 2023-11-20 ENCOUNTER — TELEPHONE (OUTPATIENT)
Dept: ORTHOPEDICS | Facility: CLINIC | Age: 58
End: 2023-11-20
Payer: COMMERCIAL

## 2023-11-21 ENCOUNTER — CLINICAL SUPPORT (OUTPATIENT)
Dept: REHABILITATION | Facility: HOSPITAL | Age: 58
End: 2023-11-21
Payer: COMMERCIAL

## 2023-11-21 DIAGNOSIS — M79.602 LEFT ARM PAIN: Primary | ICD-10-CM

## 2023-11-21 DIAGNOSIS — S50.12XA CONTUSION OF ELBOW AND FOREARM, LEFT, INITIAL ENCOUNTER: ICD-10-CM

## 2023-11-21 DIAGNOSIS — R29.898 LEFT HAND WEAKNESS: ICD-10-CM

## 2023-11-21 PROCEDURE — 97110 THERAPEUTIC EXERCISES: CPT

## 2023-11-21 NOTE — PROGRESS NOTES
"[facility]  Occupational Therapy Treatment Note     Date: 11/21/2023  Name: Marj Lazar  Clinic Number: [id number]    Therapy Diagnosis:   Encounter Diagnoses   Name Primary?    Left arm pain Yes    Contusion of elbow and forearm, left, initial encounter     Left hand weakness      Physician: Oscar Chapa MD    Physician Orders: Eval and Treat  Medical Diagnosis: S50.12XA-- Contusion, forearm and elbow, left, initial encounter   Evaluation Date: 10/19/2023  Insurance Authorization Period Expiration: 11/29/23  Plan of Care Certification Period: 11/29/23  Date of Return to MD: N/A  Visit # / Visits authorized: 9 / 12  FOTO: 2/ 3        Time In: 0930  Time Out: 1000  Total Billable Time: 30 minutes      Subjective     Patient reports: decreased sharp shooting pain over the weekend with more of a constant ache.   She was compliant with home exercise program given last session.   Response to previous treatment:good    Functional change: used the L hand more over the weekend secondary to a decrease in the shooting pain.     Pain: 2/10  Location: left hands      Objective         Intake Outcome Measure for FOTO Hand Survey     Therapist reviewed FOTO scores for Marj Lazar on 11/16/2023.   FOTO report - see Media section or FOTO account episode details.     Intake Score (10/19): 90.0%  Intake Score (11/2): 93.3%  Intake Score (11/16) 68.3%          Treatment     Marj received the treatments listed below:      therapeutic exercises to develop strength, endurance, and ROM for 30 minutes including:  - 3x 10 reps of L active elbow flexion to full extension using 1# dumbbell  - 3x 30 sec hold for wrist extension/flexion stretching   - 2x 30 sec hold for median nerve stretch  - 1x 10 reps of tendon glides:  - Series A:   - begin with hand positioned upright, wrist straight with fully extended digits  - bend the IP joints to "hook" down, resulting with knuckles pointing up  - complete series in " "tight fist with thumb over your fingers     Series B:   - begin with hand positioned upright, wrist straight with fully extended digits  - bend the MCP joint to create a "tabletop" with your fingers, keeping the IP joints straight  - bend the fingers at the PIP joint, bringing the finger tips to the palm  - 3x 10 reps of L active wrist extension/ flexion using 1# dumbbell   - Occupational Therapist provided Patient with Ktaping for carpal tunnel relief       Patient Education and Home Exercises     Education provided:   Patient was educated to keep the kinesiology tape on for 3-5 days as tolerated, to watch skin for any negative reaction, as well as proper tape removal in case of increased pain or allergic reaction to tape.  - Progress towards goals      Assessment     Patient tolerated session well. Patient presented today with nerve conduction results, concluding a positive outcome for CTS with no other nerve involvement. These results allow Patient and Occupational Therapist to create a more precise program to target the median nerve.      Marj is progressing well towards her goals and there are no updates to goals at this time. Pt prognosis is Good.     Patient will continue to benefit from skilled outpatient occupational therapy to address the deficits listed in the problem list on initial evaluation provide patient/family education and to maximize patient's level of independence in the home and community environment.     Patient's spiritual, cultural and educational needs considered and patient agreeable to plan of care and goals.    Anticipated barriers to occupational therapy: no barriers to note at this time    Goals:   - Pt will increase L  strength to 20# or more to improve participation with ADL and IADL tasks.  - Pt will increase L key and 3 pt pinch strength by 3 psi to improve performance with cooking tasks and home management.   - Pt will decrease time on 9HPT by 10 seconds with left hand  to " improve fine motor speed and coordination needed to perform  home maintenance, grooming and hygiene  - Pt will decrease pain to 1/10 on average to increase functional participation with meaningful occupations and work related tasks.     Plan     Updates/Grading for next session: Begin following a more structured CTS program following new discovery of the nerve conduction study.     Saul Rodriguez, OT   11/21/2023

## 2023-11-22 ENCOUNTER — DOCUMENTATION ONLY (OUTPATIENT)
Dept: FAMILY MEDICINE | Facility: CLINIC | Age: 58
End: 2023-11-22
Payer: COMMERCIAL

## 2023-11-24 ENCOUNTER — CLINICAL SUPPORT (OUTPATIENT)
Dept: REHABILITATION | Facility: HOSPITAL | Age: 58
End: 2023-11-24
Attending: ORTHOPAEDIC SURGERY
Payer: COMMERCIAL

## 2023-11-24 DIAGNOSIS — M79.602 LEFT ARM PAIN: Primary | ICD-10-CM

## 2023-11-24 DIAGNOSIS — R29.898 LEFT HAND WEAKNESS: ICD-10-CM

## 2023-11-24 DIAGNOSIS — S50.12XA CONTUSION OF ELBOW AND FOREARM, LEFT, INITIAL ENCOUNTER: ICD-10-CM

## 2023-11-24 PROCEDURE — 97110 THERAPEUTIC EXERCISES: CPT

## 2023-11-24 NOTE — PROGRESS NOTES
"[facility]  Occupational Therapy Treatment Note     Date: 11/24/2023  Name: Marj Lazar  Clinic Number: [id number]    Therapy Diagnosis:   Encounter Diagnoses   Name Primary?    Left arm pain Yes    Contusion of elbow and forearm, left, initial encounter     Left hand weakness      Physician: Oscar Chapa MD    Physician Orders: Eval and Treat  Medical Diagnosis: S50.12XA-- Contusion, forearm and elbow, left, initial encounter   Evaluation Date: 10/19/2023  Insurance Authorization Period Expiration: 11/29/23  Plan of Care Certification Period: 11/29/23  Date of Return to MD: N/A  Visit # / Visits authorized: 10 / 12  FOTO: 2/ 3        Time In: 0830  Time Out: 0900  Total Billable Time: 30 minutes      Subjective     Patient reports: noting some relief and increased support from Taping provided on Tuesday.    She was compliant with home exercise program given last session.   Response to previous treatment:good    Functional change: no new changes to note at this time     Pain: 2/10  Location: left hands      Objective         Intake Outcome Measure for FOTO Hand Survey     Therapist reviewed FOTO scores for Marj Lazar on 11/16/2023.   FOTO report - see Media section or FOTO account episode details.     Intake Score (10/19): 90.0%  Intake Score (11/2): 93.3%  Intake Score (11/16) 68.3%          Treatment     Marj received the treatments listed below:      therapeutic exercises to develop strength, endurance, and ROM for 30 minutes including:  - 3x 30 sec hold for wrist extension/flexion stretching   - 2x 30 sec hold for median nerve stretch  - 1x 10 reps of tendon glides:  - Series A:   - begin with hand positioned upright, wrist straight with fully extended digits  - bend the IP joints to "hook" down, resulting with knuckles pointing up  - complete series in tight fist with thumb over your fingers     Series B:   - begin with hand positioned upright, wrist straight with fully " "extended digits  - bend the MCP joint to create a "tabletop" with your fingers, keeping the IP joints straight  - bend the fingers at the PIP joint, bringing the finger tips to the palm  - 3x 10 reps of L active wrist radial and ulnar deviation      Patient Education and Home Exercises     Education provided:   - Progress towards goals      Assessment     Patient tolerated session well. Patient attempted icing after session however secondary to temperature intolerance, she only tolerated ~2 minutes.    Marj is progressing well towards her goals and there are no updates to goals at this time. Pt prognosis is Good.     Patient will continue to benefit from skilled outpatient occupational therapy to address the deficits listed in the problem list on initial evaluation provide patient/family education and to maximize patient's level of independence in the home and community environment.     Patient's spiritual, cultural and educational needs considered and patient agreeable to plan of care and goals.    Anticipated barriers to occupational therapy: no barriers to note at this time    Goals:   - Pt will increase L  strength to 20# or more to improve participation with ADL and IADL tasks.  - Pt will increase L key and 3 pt pinch strength by 3 psi to improve performance with cooking tasks and home management.   - Pt will decrease time on 9HPT by 10 seconds with left hand  to improve fine motor speed and coordination needed to perform  home maintenance, grooming and hygiene  - Pt will decrease pain to 1/10 on average to increase functional participation with meaningful occupations and work related tasks.     Plan     Updates/Grading for next session: integrate desensitization for cold temperatures during next session.      Saul Rodriguez, OT   11/24/2023    "

## 2023-11-28 ENCOUNTER — CLINICAL SUPPORT (OUTPATIENT)
Dept: REHABILITATION | Facility: HOSPITAL | Age: 58
End: 2023-11-28
Payer: COMMERCIAL

## 2023-11-28 DIAGNOSIS — R29.898 LEFT HAND WEAKNESS: ICD-10-CM

## 2023-11-28 DIAGNOSIS — M79.602 LEFT ARM PAIN: Primary | ICD-10-CM

## 2023-11-28 DIAGNOSIS — S50.12XA CONTUSION OF ELBOW AND FOREARM, LEFT, INITIAL ENCOUNTER: ICD-10-CM

## 2023-11-28 PROCEDURE — 97110 THERAPEUTIC EXERCISES: CPT

## 2023-11-28 NOTE — PROGRESS NOTES
"[facility]  Occupational Therapy Treatment Note     Date: 11/28/2023  Name: Marj Lazar  Clinic Number: [id number]    Therapy Diagnosis:   Encounter Diagnoses   Name Primary?    Left arm pain Yes    Contusion of elbow and forearm, left, initial encounter     Left hand weakness      Physician: Oscar Chapa MD    Physician Orders: Eval and Treat  Medical Diagnosis: S50.12XA-- Contusion, forearm and elbow, left, initial encounter   Evaluation Date: 10/19/2023  Insurance Authorization Period Expiration: 11/29/23  Plan of Care Certification Period: 11/29/23  Date of Return to MD: N/A  Visit # / Visits authorized: 11 / 12  FOTO: 2/ 3        Time In: 0930  Time Out: 1000  Total Billable Time: 30 minutes      Subjective     Patient reports: no new increase in pain. Patient reports she continues to not tolerate cold temperatures.    She was compliant with home exercise program given last session.   Response to previous treatment:good    Functional change: no new changes to note at this time     Pain: 2/10  Location: left hands      Objective         Intake Outcome Measure for FOTO Hand Survey     Therapist reviewed FOTO scores for Marj Lazar on 11/16/2023.   FOTO report - see Media section or FOTO account episode details.     Intake Score (10/19): 90.0%  Intake Score (11/2): 93.3%  Intake Score (11/16) 68.3%          Treatment     Marj received the treatments listed below:      therapeutic exercises to develop strength, endurance, and ROM for 30 minutes including:  - 3x 30 sec hold for wrist extension/flexion stretching   - 2x 30 sec hold for median nerve stretch  - 1x 10 reps of tendon glides:  - Series A:   - begin with hand positioned upright, wrist straight with fully extended digits  - bend the IP joints to "hook" down, resulting with knuckles pointing up  - complete series in tight fist with thumb over your fingers     Series B:   - begin with hand positioned upright, wrist straight " "with fully extended digits  - bend the MCP joint to create a "tabletop" with your fingers, keeping the IP joints straight  - bend the fingers at the PIP joint, bringing the finger tips to the palm  - 3x 10 reps of L wrist flexion and extension using 2# dumbbell  - 3x 10 reps of L active wrist radial and ulnar deviation with 1# dumbbell  - Occupational Therapist provided Ktaping to carpal tunnel location     Patient Education and Home Exercises     Education provided:  - Patient was educated to keep the kinesiology tape on for 3-5 days as tolerated, to watch skin for any negative reaction, as well as proper tape removal in case of increased pain or allergic reaction to tape.   - Progress towards goals      Assessment     Patient tolerated session well. Patient tolerated upgrade in weight for wrist strengthening with no increase in pain. Patient reports Ktaping providing relief to the wrist noting a difference in when she had it on wrist versus when it fell off.     Marj is progressing well towards her goals and there are no updates to goals at this time. Pt prognosis is Good.     Patient will continue to benefit from skilled outpatient occupational therapy to address the deficits listed in the problem list on initial evaluation provide patient/family education and to maximize patient's level of independence in the home and community environment.     Patient's spiritual, cultural and educational needs considered and patient agreeable to plan of care and goals.    Anticipated barriers to occupational therapy: no barriers to note at this time    Goals:   - Pt will increase L  strength to 20# or more to improve participation with ADL and IADL tasks.  - Pt will increase L key and 3 pt pinch strength by 3 psi to improve performance with cooking tasks and home management.   - Pt will decrease time on 9HPT by 10 seconds with left hand  to improve fine motor speed and coordination needed to perform  home maintenance, " grooming and hygiene  - Pt will decrease pain to 1/10 on average to increase functional participation with meaningful occupations and work related tasks.     Plan     Updates/Grading for next session: integrate desensitization for cold temperatures during next session.      Saul Rodriguez OT   11/28/2023

## 2023-11-29 ENCOUNTER — PATIENT MESSAGE (OUTPATIENT)
Dept: FAMILY MEDICINE | Facility: CLINIC | Age: 58
End: 2023-11-29
Payer: COMMERCIAL

## 2023-11-29 DIAGNOSIS — Z79.890 POSTMENOPAUSAL HORMONE REPLACEMENT THERAPY: Primary | ICD-10-CM

## 2023-11-30 ENCOUNTER — CLINICAL SUPPORT (OUTPATIENT)
Dept: REHABILITATION | Facility: HOSPITAL | Age: 58
End: 2023-11-30
Payer: COMMERCIAL

## 2023-11-30 DIAGNOSIS — R29.898 LEFT HAND WEAKNESS: ICD-10-CM

## 2023-11-30 DIAGNOSIS — M79.602 LEFT ARM PAIN: Primary | ICD-10-CM

## 2023-11-30 DIAGNOSIS — S50.12XA CONTUSION OF ELBOW AND FOREARM, LEFT, INITIAL ENCOUNTER: ICD-10-CM

## 2023-11-30 PROCEDURE — 97110 THERAPEUTIC EXERCISES: CPT

## 2023-11-30 NOTE — PROGRESS NOTES
[facility]  Occupational Therapy Treatment Note     Date: 11/30/2023  Name: Marj Lazar  Clinic Number: [id number]    Therapy Diagnosis:   Encounter Diagnoses   Name Primary?    Left arm pain Yes    Contusion of elbow and forearm, left, initial encounter     Left hand weakness      Physician: Oscar Chapa MD    Physician Orders: Eval and Treat  Medical Diagnosis: S50.12XA-- Contusion, forearm and elbow, left, initial encounter   Evaluation Date: 10/19/2023  Insurance Authorization Period Expiration: 11/29/23  Plan of Care Certification Period: 11/29/23  Date of Return to MD: N/A  Visit # / Visits authorized: 12 / 12  FOTO: 2/ 3        Time In: 0930  Time Out: 1010  Total Billable Time: 40 minutes      Subjective     Patient reports: her hand feels significantly weaker over the past few days resulting in difficulty picking up things around her house and having a fear of dropping them.   She was compliant with home exercise program given last session.   Response to previous treatment:good    Functional change: no new changes to note at this time     Pain: 5/10  Location: left hands      Objective         Intake Outcome Measure for FOTO Hand Survey     Therapist reviewed FOTO scores for Marj Lazar on 11/30/2023.   FOTO report - see Media section or FOTO account episode details.     Intake Score (10/19): 90.0%  Intake Score (11/2): 93.3%  Intake Score (11/16) 68.3%  Intake Score (11/30): 91.7%          Treatment     Marj received the treatments listed below:      therapeutic exercises to develop strength, endurance, and ROM for 40 minutes including:  - 3x 30 sec hold for wrist extension/flexion stretching   - 3x 10 reps of L wrist flexion and extension using 1# dumbbell  - 3x 10 reps of L active wrist radial and ulnar deviation   - 3x 10 reps of gross grasp using yellow foam block     Patient Education and Home Exercises     Education provided:  - Progress towards goals       Assessment     Patient tolerated session well. Downgrades applied for today's session secondary to Patient's ongoing pain. Patient reports moving the hand and wrist did help in some relief however she does feel significantly weak still. Occupational Therapist encouraged Patient to rest this weekend doing minimal strenuous activity with her L hand.     Marj is progressing well towards her goals and there are no updates to goals at this time. Pt prognosis is Good.     Patient will continue to benefit from skilled outpatient occupational therapy to address the deficits listed in the problem list on initial evaluation provide patient/family education and to maximize patient's level of independence in the home and community environment.     Patient's spiritual, cultural and educational needs considered and patient agreeable to plan of care and goals.    Anticipated barriers to occupational therapy: no barriers to note at this time    Goals:   - Pt will increase L  strength to 20# or more to improve participation with ADL and IADL tasks.  - Pt will increase L key and 3 pt pinch strength by 3 psi to improve performance with cooking tasks and home management.   - Pt will decrease time on 9HPT by 10 seconds with left hand  to improve fine motor speed and coordination needed to perform  home maintenance, grooming and hygiene  - Pt will decrease pain to 1/10 on average to increase functional participation with meaningful occupations and work related tasks.     Plan     Updates/Grading for next session: integrate desensitization for cold temperatures during next session.      Saul Rodriguez OT   11/30/2023

## 2023-12-04 DIAGNOSIS — S50.12XA: Primary | ICD-10-CM

## 2023-12-05 ENCOUNTER — CLINICAL SUPPORT (OUTPATIENT)
Dept: REHABILITATION | Facility: HOSPITAL | Age: 58
End: 2023-12-05
Payer: COMMERCIAL

## 2023-12-05 DIAGNOSIS — S50.12XA CONTUSION OF ELBOW AND FOREARM, LEFT, INITIAL ENCOUNTER: ICD-10-CM

## 2023-12-05 DIAGNOSIS — M79.602 LEFT ARM PAIN: Primary | ICD-10-CM

## 2023-12-05 DIAGNOSIS — R29.898 LEFT HAND WEAKNESS: ICD-10-CM

## 2023-12-05 PROCEDURE — 97110 THERAPEUTIC EXERCISES: CPT

## 2023-12-05 NOTE — PROGRESS NOTES
[facility]  Occupational Therapy Treatment Note     Date: 12/5/2023  Name: Marj Lazar  Clinic Number: [id number]    Therapy Diagnosis:   Encounter Diagnoses   Name Primary?    Left arm pain Yes    Contusion of elbow and forearm, left, initial encounter     Left hand weakness      Physician: Oscar Chapa MD    Physician Orders: Eval and Treat  Medical Diagnosis: S50.12XA-- Contusion, forearm and elbow, left, initial encounter   Evaluation Date: 10/19/2023  Insurance Authorization Period Expiration: 1/12/24  Plan of Care Certification Period: 1/12/24  Date of Return to MD: N/A  Visit # / Visits authorized: 1 / 12  FOTO: 2/ 3        Time In: 0930  Time Out: 1010  Total Billable Time: 40 minutes      Subjective     Patient reports: the shocking along median nerve has become very painful with cold temperatures.    She was compliant with home exercise program given last session.   Response to previous treatment:good    Functional change: no new changes to note at this time     Pain: 5/10  Location: left hands      Objective         Intake Outcome Measure for FOTO Hand Survey     Therapist reviewed FOTO scores for Marj Lazar on 11/30/2023.   FOTO report - see Media section or FOTO account episode details.     Intake Score (10/19): 90.0%  Intake Score (11/2): 93.3%  Intake Score (11/16) 68.3%  Intake Score (11/30): 91.7%          Treatment     Marj received the treatments listed below:      therapeutic exercises to develop strength, endurance, and ROM for 40 minutes including:  - 3x 30 sec hold for wrist extension/flexion stretching   - 3x 30 sec hold for median nerve stretch   - 3x 10 reps of L wrist flexion and extension using 2# dumbbell  - 3x 10 reps of L active wrist radial and ulnar deviation using 1# dumbbell  - 2 rounds of 1 minute plunge into room temp water for desensitization     Patient Education and Home Exercises     Education provided:  - Progress towards goals       Assessment     Patient tolerated session well. Patient tolerated upgraded weight for wrist strengthening well. During temperature desensitization, Patient tolerated in water fair with reports of shocking however becoming adjusted and she left hand in water. Increased shocking was experienced when Patient removed hand from water and was exposed to air temperature while wet.     Marj is progressing well towards her goals and there are no updates to goals at this time. Pt prognosis is Good.     Patient will continue to benefit from skilled outpatient occupational therapy to address the deficits listed in the problem list on initial evaluation provide patient/family education and to maximize patient's level of independence in the home and community environment.     Patient's spiritual, cultural and educational needs considered and patient agreeable to plan of care and goals.    Anticipated barriers to occupational therapy: no barriers to note at this time    Goals:   - Pt will increase L  strength to 20# or more to improve participation with ADL and IADL tasks.  - Pt will increase L key and 3 pt pinch strength by 3 psi to improve performance with cooking tasks and home management.   - Pt will decrease time on 9HPT by 10 seconds with left hand  to improve fine motor speed and coordination needed to perform  home maintenance, grooming and hygiene  - Pt will decrease pain to 1/10 on average to increase functional participation with meaningful occupations and work related tasks.     Plan     Updates/Grading for next session:continue desensitization of cold temperatures .      Saul Rodriguez OT   12/5/2023

## 2023-12-06 ENCOUNTER — OFFICE VISIT (OUTPATIENT)
Dept: ORTHOPEDICS | Facility: CLINIC | Age: 58
End: 2023-12-06
Payer: COMMERCIAL

## 2023-12-06 VITALS
BODY MASS INDEX: 25.61 KG/M2 | HEART RATE: 90 BPM | SYSTOLIC BLOOD PRESSURE: 122 MMHG | HEIGHT: 64 IN | WEIGHT: 150 LBS | DIASTOLIC BLOOD PRESSURE: 81 MMHG

## 2023-12-06 DIAGNOSIS — G56.02 LEFT CARPAL TUNNEL SYNDROME: ICD-10-CM

## 2023-12-06 DIAGNOSIS — S50.12XA: Primary | ICD-10-CM

## 2023-12-06 PROCEDURE — 99214 PR OFFICE/OUTPT VISIT, EST, LEVL IV, 30-39 MIN: ICD-10-PCS | Mod: 25,,, | Performed by: ORTHOPAEDIC SURGERY

## 2023-12-06 PROCEDURE — 20526 PR INJECT CARPAL TUNNEL: ICD-10-PCS | Mod: LT,,, | Performed by: ORTHOPAEDIC SURGERY

## 2023-12-06 PROCEDURE — 20526 THER INJECTION CARP TUNNEL: CPT | Mod: LT,,, | Performed by: ORTHOPAEDIC SURGERY

## 2023-12-06 PROCEDURE — 99214 OFFICE O/P EST MOD 30 MIN: CPT | Mod: 25,,, | Performed by: ORTHOPAEDIC SURGERY

## 2023-12-06 RX ORDER — BETAMETHASONE SODIUM PHOSPHATE AND BETAMETHASONE ACETATE 3; 3 MG/ML; MG/ML
3 INJECTION, SUSPENSION INTRA-ARTICULAR; INTRALESIONAL; INTRAMUSCULAR; SOFT TISSUE
Status: DISCONTINUED | OUTPATIENT
Start: 2023-12-06 | End: 2023-12-06 | Stop reason: HOSPADM

## 2023-12-06 RX ADMIN — BETAMETHASONE SODIUM PHOSPHATE AND BETAMETHASONE ACETATE 3 MG: 3; 3 INJECTION, SUSPENSION INTRA-ARTICULAR; INTRALESIONAL; INTRAMUSCULAR; SOFT TISSUE at 10:12

## 2023-12-06 NOTE — LETTER
[facility] [address]  [place], [address]  Phone: [phone]  Fax: [phone]    Name:Marj Lazar  :1965   Date:2023     PATIENT IS UNABLE TO WORK AS OF:23  [_] Pending treatment.  [x] For approximately [_] Days [6] Weeks [_] Months  [_] Pending diagnostic testing.  [_] Pending surgical treatment.  [_] For approximately _ months (Post Surgery)    PATIENT IS ABLE TO RETURN TO WORK AS OF:re-eval at next appt on 24      COMMENTS     Oscar Chapa MD / Magy Betts PA-C

## 2023-12-06 NOTE — PROGRESS NOTES
"Subjective:    CC: Results of the Left Hand (LUE NCS - pt states that she is in a lot of pain still - she is in a brace )       HPI:  Patient returns today for repeat exam.  Patient had a nerve conduction study left upper extremity.  We have discussed her results.  Continues to have numbness and tingling generalized in her forearm and hand.  She is been going to OT, taking anti-inflammatories.    ROS: Refer to HPI for pertinent ROS. All other 12 point systems negative.    Objective:  Vitals:    12/06/23 1040   BP: 122/81   Pulse: 90   Weight: 68 kg (150 lb)   Height: 5' 4" (1.626 m)        Physical Exam:  Left upper extremity compartments are soft and warm.  Skin is intact.  There is no signs symptoms of DVT or infection.  She does have a positive Tinel's questionable Phalen's.  There was no obvious thenar hypothenar wasting.  She is able make a full fist has full extension has appropriate motion, neurovascular intact distally.    Images: . Images Reviewed and discussed with patient.    Assessment:  1. Contusion, forearm and elbow, left, initial encounter    2. Left carpal tunnel syndrome  - Carpal Tunnel        Plan:  At this time we discussed her physical exam and intraoperative findings.  She tolerated the steroid injection very well the carpal tunnel.  I believe this will be therapeutic and diagnostic.  She will continue with the night splint as needed, continue physical therapy, anti-inflammatories with appropriate precautions.  I would like see back in 6 weeks to see how she is progressing.    Follow UP: No follow-ups on file.    Carpal Tunnel    Date/Time: 12/6/2023 10:15 AM    Performed by: Oscar Chapa MD  Authorized by: Oscar Chapa MD    Medications:  3 mg betamethasone acetate-betamethasone sodium phosphate 6 mg/mL       ADDENDUM 12/18/23:  COMMUNICATED WITH PATIENT VIA THE PORTAL.  SHE STATES THAT SHE CONTINUES TO HAVE NUMBNESS AND TINGLING AND WOULD LIKE FURTHER WORKUP WITH A NEUROLOGIST.  SHE " HAS ALREADY HAD A NERVE CONDUCTION STUDY WITH DR. MORAN AND WOULD LIKE FURTHER EVALUATION WITH HIS OFFICE.

## 2023-12-06 NOTE — PROCEDURES
Carpal Tunnel    Date/Time: 12/6/2023 10:15 AM    Performed by: Oscar Chapa MD  Authorized by: Oscar Chapa MD    Medications:  3 mg betamethasone acetate-betamethasone sodium phosphate 6 mg/mL

## 2023-12-07 ENCOUNTER — CLINICAL SUPPORT (OUTPATIENT)
Dept: REHABILITATION | Facility: HOSPITAL | Age: 58
End: 2023-12-07
Payer: COMMERCIAL

## 2023-12-07 DIAGNOSIS — S50.12XA CONTUSION OF ELBOW AND FOREARM, LEFT, INITIAL ENCOUNTER: ICD-10-CM

## 2023-12-07 DIAGNOSIS — M79.602 LEFT ARM PAIN: Primary | ICD-10-CM

## 2023-12-07 DIAGNOSIS — R29.898 LEFT HAND WEAKNESS: ICD-10-CM

## 2023-12-07 PROCEDURE — 97110 THERAPEUTIC EXERCISES: CPT

## 2023-12-07 NOTE — PROGRESS NOTES
[facility]  Occupational Therapy Treatment Note     Date: 12/7/2023  Name: Marj Lazar  Clinic Number: [id number]    Therapy Diagnosis:   Encounter Diagnoses   Name Primary?    Left arm pain Yes    Contusion of elbow and forearm, left, initial encounter     Left hand weakness      Physician: Oscar Chapa MD    Physician Orders: Eval and Treat  Medical Diagnosis: S50.12XA-- Contusion, forearm and elbow, left, initial encounter   Evaluation Date: 10/19/2023  Insurance Authorization Period Expiration: 1/12/24  Plan of Care Certification Period: 1/12/24  Date of Return to MD: N/A  Visit # / Visits authorized: 2 / 12  FOTO: 2/ 3        Time In: 0930  Time Out: 1000  Total Billable Time: 30 minutes      Subjective     Patient reports: she met with Dr. Chapa yesterday whom encouraged her to continue with therapy as her recovery will simply take time due to the severity of her injury.  Patient also reported she received a cortisone shot in the carpal tunnel region yesterday which has provided relief from pain.  She was compliant with home exercise program given last session.   Response to previous treatment:good    Functional change: no new changes to note at this time     Pain: 2/10  Location: left hands      Objective         Intake Outcome Measure for FOTO Hand Survey     Therapist reviewed FOTO scores for Marj Lazar on 11/30/2023.   FOTO report - see Media section or FOTO account episode details.     Intake Score (10/19): 90.0%  Intake Score (11/2): 93.3%  Intake Score (11/16) 68.3%  Intake Score (11/30): 91.7%          Treatment     Marj received the treatments listed below:      therapeutic exercises to develop strength, endurance, and ROM for 30 minutes including:  - 5 minutes of heat applied to L hand to begin session   - 2 rounds of ~45 seconds plunge into luke warm water for desensitization.   - 2x 10 reps of tendon glides:    Series A:   - begin with hand positioned upright,  "wrist straight with fully extended digits  - bend the IP joints to "hook" down, resulting with knuckles pointing up  - complete series in tight fist with thumb over your fingers   Series B:   - begin with hand positioned upright, wrist straight with fully extended digits  - bend the MCP joint to create a "tabletop" with your fingers, keeping the IP joints straight  - bend the fingers at the PIP joint, bringing the finger tips to the palm  - 2x 10 reps of finger opposition using marble between digits.     Patient Education and Home Exercises     Education provided:  - Progress towards goals      Assessment     Patient tolerated session well. During desensitization Patient reported she experienced mild tingling while hand was in water however once removed and exposed to cooler air her discomfort became more significant. Patient did tolerate all strengthening and tendon glides with ease as a possible result of the injection. Occupational Therapist and Patient agreed that the cortisone could allow a time of relief to increase her strengthening.     Marj is progressing well towards her goals and there are no updates to goals at this time. Pt prognosis is Good.     Patient will continue to benefit from skilled outpatient occupational therapy to address the deficits listed in the problem list on initial evaluation provide patient/family education and to maximize patient's level of independence in the home and community environment.     Patient's spiritual, cultural and educational needs considered and patient agreeable to plan of care and goals.    Anticipated barriers to occupational therapy: no barriers to note at this time    Goals:   - Pt will increase L  strength to 20# or more to improve participation with ADL and IADL tasks.  - Pt will increase L key and 3 pt pinch strength by 3 psi to improve performance with cooking tasks and home management.   - Pt will decrease time on 9HPT by 10 seconds with left hand  to " improve fine motor speed and coordination needed to perform  home maintenance, grooming and hygiene  - Pt will decrease pain to 1/10 on average to increase functional participation with meaningful occupations and work related tasks.     Plan     Updates/Grading for next session:continue desensitization of cold temperatures .      Saul Rodriguez OT   12/7/2023

## 2023-12-12 ENCOUNTER — CLINICAL SUPPORT (OUTPATIENT)
Dept: REHABILITATION | Facility: HOSPITAL | Age: 58
End: 2023-12-12
Payer: COMMERCIAL

## 2023-12-12 DIAGNOSIS — S50.12XA CONTUSION OF ELBOW AND FOREARM, LEFT, INITIAL ENCOUNTER: ICD-10-CM

## 2023-12-12 DIAGNOSIS — R29.898 LEFT HAND WEAKNESS: ICD-10-CM

## 2023-12-12 DIAGNOSIS — M79.602 LEFT ARM PAIN: Primary | ICD-10-CM

## 2023-12-12 PROCEDURE — 97110 THERAPEUTIC EXERCISES: CPT

## 2023-12-12 NOTE — PROGRESS NOTES
[facility]  Occupational Therapy Treatment Note     Date: 12/12/2023  Name: Marj Lazar  Clinic Number: [id number]    Therapy Diagnosis:   Encounter Diagnoses   Name Primary?    Left arm pain Yes    Contusion of elbow and forearm, left, initial encounter     Left hand weakness      Physician: Oscar Chapa MD    Physician Orders: Eval and Treat  Medical Diagnosis: S50.12XA-- Contusion, forearm and elbow, left, initial encounter   Evaluation Date: 10/19/2023  Insurance Authorization Period Expiration: 1/12/24  Plan of Care Certification Period: 1/12/24  Date of Return to MD: N/A  Visit # / Visits authorized: 3 / 12  FOTO: 2/ 3        Time In: 0930  Time Out: 1000  Total Billable Time: 30 minutes      Subjective     Patient reports: she attempted to rest the hand as much as possible this weekend and she experienced more pain with a 6/10. Patient presented with wrist brace today reporting Dr. Chapa suggested it with good relief.    She was compliant with home exercise program given last session.   Response to previous treatment:good    Functional change: no new changes to note at this time     Pain: 5/10  Location: left hands      Objective         Intake Outcome Measure for FOTO Hand Survey     Therapist reviewed FOTO scores for Marj Lazar on 11/30/2023.   FOTO report - see Media section or FOTO account episode details.     Intake Score (10/19): 90.0%  Intake Score (11/2): 93.3%  Intake Score (11/16) 68.3%  Intake Score (11/30): 91.7%          Treatment     Marj received the treatments listed below:      therapeutic exercises to develop strength, endurance, and ROM for 30 minutes including:  - 3x 30 sec hold for wrist extension/flexion stretching   - 3x 10 reps of L wrist flexion and extension using 2# dumbbell  - 3x 10 reps of L active wrist radial and ulnar deviation using 1# dumbbell  - 3x 10 reps of gross  strengthening using medium hand gripper   - Using L thumb, Patient  completed thumb adduction movement to place 15 beads back into green theraputty.     Patient Education and Home Exercises     Education provided:  - Progress towards goals      Assessment     Patient tolerated session well. Following strengthening, Patient reported aching of the hand like muscle fatigue but no increase in shocking pain. Following theraputty exercise, Patient reported mild numbness in tip of thumb however with stretching it did resolve itself. Patient reports continuing to attempt desensitization at home of cold temperatures .    Marj is progressing well towards her goals and there are no updates to goals at this time. Pt prognosis is Good.     Patient will continue to benefit from skilled outpatient occupational therapy to address the deficits listed in the problem list on initial evaluation provide patient/family education and to maximize patient's level of independence in the home and community environment.     Patient's spiritual, cultural and educational needs considered and patient agreeable to plan of care and goals.    Anticipated barriers to occupational therapy: no barriers to note at this time    Goals:   - Pt will increase L  strength to 20# or more to improve participation with ADL and IADL tasks.  - Pt will increase L key and 3 pt pinch strength by 3 psi to improve performance with cooking tasks and home management.   - Pt will decrease time on 9HPT by 10 seconds with left hand  to improve fine motor speed and coordination needed to perform  home maintenance, grooming and hygiene  - Pt will decrease pain to 1/10 on average to increase functional participation with meaningful occupations and work related tasks.     Plan     Updates/Grading for next session:continue desensitization of cold temperatures every other session with Patient continuing to attempt at home.      Saul Rodriguez OT   12/12/2023

## 2023-12-14 ENCOUNTER — PATIENT MESSAGE (OUTPATIENT)
Dept: ORTHOPEDICS | Facility: CLINIC | Age: 58
End: 2023-12-14
Payer: COMMERCIAL

## 2023-12-14 ENCOUNTER — CLINICAL SUPPORT (OUTPATIENT)
Dept: REHABILITATION | Facility: HOSPITAL | Age: 58
End: 2023-12-14
Payer: COMMERCIAL

## 2023-12-14 DIAGNOSIS — S50.12XA CONTUSION OF ELBOW AND FOREARM, LEFT, INITIAL ENCOUNTER: ICD-10-CM

## 2023-12-14 DIAGNOSIS — R29.898 LEFT HAND WEAKNESS: ICD-10-CM

## 2023-12-14 DIAGNOSIS — M79.602 LEFT ARM PAIN: Primary | ICD-10-CM

## 2023-12-14 PROCEDURE — 97110 THERAPEUTIC EXERCISES: CPT

## 2023-12-14 NOTE — PROGRESS NOTES
[facility]  Occupational Therapy Re- Assessment and Treatment Note     Date: 12/14/2023  Name: Marj Lazar  [facility] Number: [id number]    Therapy Diagnosis:   Encounter Diagnoses   Name Primary?    Left arm pain Yes    Contusion of elbow and forearm, left, initial encounter     Left hand weakness      Physician: Kelly Garces,*    Physician Orders: Eval and Treat  Medical Diagnosis: S50.12XA-- Contusion, forearm and elbow, left, initial encounter   Evaluation Date: 10/19/2023  Insurance Authorization Period Expiration: 1/12/24  Plan of Care Certification Period: 1/12/24  Date of Return to MD: N/A  Visit # / Visits authorized: 4 / 12  FOTO: 5 / 3        Time In: 0900  Time Out: 0930  Total Billable Time: 30 minutes      Subjective     Patient reports: increased pain today with no note of doing things differently to aggravate the hand. Patient reports she had increased pain yesterday, taking tylenol to resolve it. Despite the continued pain Patient does report a noted increase in functional strength for daily activities.   She was compliant with home exercise program given last session.   Response to previous treatment:good    Functional change: no new changes to note at this time     Pain: 7/10  Location: left hands      Objective      Elbow and Wrist ROM. Measured in degrees.    10/19/2023 11/16/23 12/14/23     Left Left Left   Supination/Pronation Sup: 40  Pron: full Sup: 50 with stretch sensation Sup: full Range of Motion with no difficulty   Wrist Ext/Flex Full with pain Flx: full with no pain  Ext: mild pull/pop with full  Full Range of Motion              Strength (Dyanmometer) and Pinch Strength (Pinch Gauge)  Measured in pounds and psi. Average of three trials.    10/19/2023 10/19/2023 11/16/23 12/14/23     Right  Left  Left Left   Rung II 34.3# unable 2# 2.7#   Key Pinch 4.2 psi .67 psi  1.2 psi .83 psi   3pt Pinch 1.2 psi Unable  unable .33 psi         Fine Motor  Coordination: 9 Hole Peg Test  Right  10/19/23 Left    10/19/23 Left  11/16/23 Left  12/14/23   22.04 sec 38.22 sec 52.65 sec 43.32 sec       Intake Outcome Measure for FOTO Hand Survey     Therapist reviewed FOTO scores for Marj Lazar on 12/14/2023.   FOTO report - see Media section or FOTO account episode details.     Intake Score (10/19): 90.0%  Intake Score (11/2): 93.3%  Intake Score (11/16) 68.3%  Intake Score (11/30): 91.7%  Intake Score (12/14): 92.5%          Treatment     Marj received the treatments listed below:      therapeutic exercises to develop strength, endurance, and ROM for 30 minutes including:  Patient completed FOTO outcomes measure, re-assessments and 5 minutes of heat to relieve some pain following all testing.     Patient Education and Home Exercises     Education provided:  - Progress towards goals      Assessment     Patient continues to experience significant pain in the L hand affecting her daily routines, ADL, and quality of life. Despite the ongoing pain Patient has noted an increase in strength and demonstrates mild progress in above outcomes. The most significant change is noted in Patient completing 9 hole peg 10 seconds faster than previous attempt. Occupational Therapist and Patient discussed shifting the focus of her plan of care to a more aggressive strengthening program to allow the muscles to increase stabilization within the wrist and hand. Occupational Therapist will communicate with MD to assure a proper approach moving forward to allow progress for the Patient.     Marj is progressing well towards her goals and there are no updates to goals at this time. Pt prognosis is Good.     Patient will continue to benefit from skilled outpatient occupational therapy to address the deficits listed in the problem list on initial evaluation provide patient/family education and to maximize patient's level of independence in the home and community environment.     Patient's  spiritual, cultural and educational needs considered and patient agreeable to plan of care and goals.    Anticipated barriers to occupational therapy: no barriers to note at this time    Goals:   - Pt will increase L  strength to 20# or more to improve participation with ADL and IADL tasks.--ongoing, progressing  - Pt will increase L key and 3 pt pinch strength by 3 psi to improve performance with cooking tasks and home management.--ongoing, progressing   - Pt will decrease time on 9HPT by 10 seconds with left hand  to improve fine motor speed and coordination needed to perform  home maintenance, grooming and hygiene--ongoing, progressing  - Pt will decrease pain to 1/10 on average to increase functional participation with meaningful occupations and work related tasks --ongoing, progressing    Plan     Updates/Grading for next session: continue with current plan of care     Saul Rodriguez OT   12/14/2023

## 2023-12-18 ENCOUNTER — TELEPHONE (OUTPATIENT)
Dept: ORTHOPEDICS | Facility: CLINIC | Age: 58
End: 2023-12-18
Payer: COMMERCIAL

## 2023-12-18 NOTE — TELEPHONE ENCOUNTER
Saul from [facility] called to discuss further treatment with Dr. Chapa.     Attempted to call her back at 4:30pm on 12/18/23, she had already left for the day. Advised that Dr. Chapa will try to call her back tomorrow.

## 2023-12-19 ENCOUNTER — CLINICAL SUPPORT (OUTPATIENT)
Dept: REHABILITATION | Facility: HOSPITAL | Age: 58
End: 2023-12-19
Payer: COMMERCIAL

## 2023-12-19 ENCOUNTER — TELEPHONE (OUTPATIENT)
Dept: REHABILITATION | Facility: HOSPITAL | Age: 58
End: 2023-12-19
Payer: COMMERCIAL

## 2023-12-19 DIAGNOSIS — S50.12XA CONTUSION OF ELBOW AND FOREARM, LEFT, INITIAL ENCOUNTER: ICD-10-CM

## 2023-12-19 DIAGNOSIS — R29.898 LEFT HAND WEAKNESS: ICD-10-CM

## 2023-12-19 DIAGNOSIS — M79.602 LEFT ARM PAIN: Primary | ICD-10-CM

## 2023-12-19 PROCEDURE — 97110 THERAPEUTIC EXERCISES: CPT

## 2023-12-19 NOTE — TELEPHONE ENCOUNTER
Dr. Chapa called Occupational Therapist this morning to further discuss Patient's deficits and continued report of pain. MD validated Occupational Therapist's approach to continue strengthening of the L hand and wrist allowing pain to guide us with each visit.   MD and Occupational Therapist discussed referring Patient to neurologist for further assessment of suspected RSD along with her reported continued neck pain.   Occupational Therapist provided education on the possible benefits of Patient using NMES unit to decreased pain with MD agreeing to order and verbalizing he will sign and return tomorrow (12/19).

## 2023-12-19 NOTE — PROGRESS NOTES
[facility]  Occupational Therapy Treatment Note     Date: 12/19/2023  Name: Marj Lazar  Clinic Number: [id number]    Therapy Diagnosis:   Encounter Diagnoses   Name Primary?    Left arm pain Yes    Contusion of elbow and forearm, left, initial encounter     Left hand weakness      Physician: Oscar Chapa MD    Physician Orders: Eval and Treat  Medical Diagnosis: S50.12XA-- Contusion, forearm and elbow, left, initial encounter   Evaluation Date: 10/19/2023  Insurance Authorization Period Expiration: 1/12/24  Plan of Care Certification Period: 1/12/24  Date of Return to MD: N/A  Visit # / Visits authorized: 5 / 12  FOTO: 2/ 3        Time In: 0930  Time Out: 1015  Total Billable Time: 45 minutes      Subjective     Patient reports: she continues to experience a nagging type of pain in the forearm and wrist region. Patient does mention feeling stronger in the hand however the pain continues to be her biggest limitation.  She was compliant with home exercise program given last session.   Response to previous treatment:good    Functional change: increased functional strength of the L hand     Pain: 3/10  Location: left hands      Objective         Intake Outcome Measure for FOTO Hand Survey     Therapist reviewed FOTO scores for Marj Lazar on 12/14/2023.   FOTO report - see Media section or FOTO account episode details.     Intake Score (10/19): 90.0%  Intake Score (11/2): 93.3%  Intake Score (11/16) 68.3%  Intake Score (11/30): 91.7%  Intake Score (12/14): 92.5%          Treatment     Marj received the treatments listed below:      therapeutic exercises to develop strength, endurance, and ROM for 45 minutes including:  - 3x 30 sec hold for wrist extension/flexion stretching   - 3x 10 reps of L wrist flexion and extension using 2# dumbbell  - 3x 10 reps of L active wrist radial and ulnar deviation using 1# dumbbell  - 3x 10 reps of gross  strengthening using medium hand gripper   -  Occupational Therapist provided taping to dorsal side of L hand with attempt to decrease pain, providing no tension during application.     Patient Education and Home Exercises     Education provided:  - Patient was educated to keep the kinesiology tape on for 3-5 days as tolerated, to watch skin for any negative reaction, as well as proper tape removal in case of increased pain or allergic reaction to tape.  - Progress towards goals      Assessment     Patient tolerated session well. Occupational Therapist and Patient discussed continued plan of care allowing her daily pain to guide each session and her tolerance to resistance or desensitization activities. Patient has also requested a referral to Neurologist for further assessment of the nerve pain radiating to her neck. Occupational Therapist supported this further assessment to allow better insight into her deficits.     Marj is progressing well towards her goals and there are no updates to goals at this time. Pt prognosis is Good.     Patient will continue to benefit from skilled outpatient occupational therapy to address the deficits listed in the problem list on initial evaluation provide patient/family education and to maximize patient's level of independence in the home and community environment.     Patient's spiritual, cultural and educational needs considered and patient agreeable to plan of care and goals.    Anticipated barriers to occupational therapy: no barriers to note at this time    Goals:   - Pt will increase L  strength to 20# or more to improve participation with ADL and IADL tasks.--ongoing, progressing  - Pt will increase L key and 3 pt pinch strength by 3 psi to improve performance with cooking tasks and home management.--ongoing, progressing   - Pt will decrease time on 9HPT by 10 seconds with left hand  to improve fine motor speed and coordination needed to perform  home maintenance, grooming and hygiene--ongoing, progressing  - Pt  will decrease pain to 1/10 on average to increase functional participation with meaningful occupations and work related tasks --ongoing, progressing    Plan     Updates/Grading for next session:continue with current plan of care allowing Patient's pain to guide each session as needed.     Saul Rodriguez, OT   12/19/2023

## 2023-12-21 ENCOUNTER — CLINICAL SUPPORT (OUTPATIENT)
Dept: REHABILITATION | Facility: HOSPITAL | Age: 58
End: 2023-12-21
Payer: COMMERCIAL

## 2023-12-21 DIAGNOSIS — M79.602 LEFT ARM PAIN: Primary | ICD-10-CM

## 2023-12-21 DIAGNOSIS — S50.12XA CONTUSION OF ELBOW AND FOREARM, LEFT, INITIAL ENCOUNTER: ICD-10-CM

## 2023-12-21 DIAGNOSIS — R29.898 LEFT HAND WEAKNESS: ICD-10-CM

## 2023-12-21 PROCEDURE — 97110 THERAPEUTIC EXERCISES: CPT

## 2023-12-21 NOTE — PROGRESS NOTES
[facility]  Occupational Therapy Treatment Note     Date: 12/21/2023  Name: Marj Lazar  Clinic Number: [id number]    Therapy Diagnosis:   Encounter Diagnoses   Name Primary?    Left arm pain Yes    Contusion of elbow and forearm, left, initial encounter     Left hand weakness      Physician: Oscar Chapa MD    Physician Orders: Eval and Treat  Medical Diagnosis: S50.12XA-- Contusion, forearm and elbow, left, initial encounter   Evaluation Date: 10/19/2023  Insurance Authorization Period Expiration: 1/12/24  Plan of Care Certification Period: 1/12/24  Date of Return to MD: N/A  Visit # / Visits authorized: 6 / 12  FOTO: 2/ 3        Time In: 0930  Time Out: 1000  Total Billable Time: 30 minutes      Subjective     Patient reports: feeling good today with minimal pain. During strengthening Patient reported challenge due to muscle weakness and fatigue however no increase in pain.   She was compliant with home exercise program given last session.   Response to previous treatment:good    Functional change: increased functional strength of the L hand     Pain: 3/10  Location: left hands      Objective         Intake Outcome Measure for FOTO Hand Survey     Therapist reviewed FOTO scores for Marj Lazar on 12/14/2023.   FOTO report - see Media section or FOTO account episode details.     Intake Score (10/19): 90.0%  Intake Score (11/2): 93.3%  Intake Score (11/16) 68.3%  Intake Score (11/30): 91.7%  Intake Score (12/14): 92.5%          Treatment     Marj received the treatments listed below:      therapeutic exercises to develop strength, endurance, and ROM for 30 minutes including:  - 3x 30 sec hold for wrist extension/flexion stretching   - 3x 10 reps of bilateral forearm supination/pronation using yellow flex bar.  - 3x 10 reps of L wrist flexion and extension using 2# dumbbell  - 3x 10 reps of L active wrist radial and ulnar deviation using 1# dumbbell  - 3x 10 reps of gross   strengthening using medium hand gripper     Patient Education and Home Exercises     Education provided:  - Progress towards goals      Assessment     Patient tolerated session well. Upgrade incorporated to program targeting the forearm with Patient reporting challenge however no increase in pain. Patient reports no increase in pain following session, only some fatigue in the distal portion of Left upper extremity     Marj is progressing well towards her goals and there are no updates to goals at this time. Pt prognosis is Good.     Patient will continue to benefit from skilled outpatient occupational therapy to address the deficits listed in the problem list on initial evaluation provide patient/family education and to maximize patient's level of independence in the home and community environment.     Patient's spiritual, cultural and educational needs considered and patient agreeable to plan of care and goals.    Anticipated barriers to occupational therapy: no barriers to note at this time    Goals:   - Pt will increase L  strength to 20# or more to improve participation with ADL and IADL tasks.--ongoing, progressing  - Pt will increase L key and 3 pt pinch strength by 3 psi to improve performance with cooking tasks and home management.--ongoing, progressing   - Pt will decrease time on 9HPT by 10 seconds with left hand  to improve fine motor speed and coordination needed to perform  home maintenance, grooming and hygiene--ongoing, progressing  - Pt will decrease pain to 1/10 on average to increase functional participation with meaningful occupations and work related tasks --ongoing, progressing    Plan     Updates/Grading for next session:continue with current plan of care allowing Patient's pain to guide each session as needed.     Saul Rodriguez, OT   12/21/2023

## 2023-12-27 ENCOUNTER — CLINICAL SUPPORT (OUTPATIENT)
Dept: REHABILITATION | Facility: HOSPITAL | Age: 58
End: 2023-12-27
Payer: COMMERCIAL

## 2023-12-27 DIAGNOSIS — R29.898 LEFT HAND WEAKNESS: ICD-10-CM

## 2023-12-27 DIAGNOSIS — S50.12XA CONTUSION OF ELBOW AND FOREARM, LEFT, INITIAL ENCOUNTER: ICD-10-CM

## 2023-12-27 DIAGNOSIS — M79.602 LEFT ARM PAIN: Primary | ICD-10-CM

## 2023-12-27 PROCEDURE — 97110 THERAPEUTIC EXERCISES: CPT

## 2023-12-27 NOTE — PROGRESS NOTES
[facility]  Occupational Therapy Treatment Note     Date: 12/27/2023  Name: Marj Lazar  Clinic Number: [id number]    Therapy Diagnosis:   Encounter Diagnoses   Name Primary?    Left arm pain Yes    Contusion of elbow and forearm, left, initial encounter     Left hand weakness      Physician: Oscar Chapa MD    Physician Orders: Eval and Treat  Medical Diagnosis: S50.12XA-- Contusion, forearm and elbow, left, initial encounter   Evaluation Date: 10/19/2023  Insurance Authorization Period Expiration: 1/12/24  Plan of Care Certification Period: 1/12/24  Date of Return to MD: N/A  Visit # / Visits authorized: 7 / 12  FOTO: 2/ 3        Time In: 0930  Time Out: 1000  Total Billable Time: 30 minutes      Subjective     Patient reports: no increase in pain from the weekend with mild aching.   She was compliant with home exercise program given last session.   Response to previous treatment:good    Functional change: increased functional strength of the L hand     Pain: 3/10  Location: left hands      Objective         Intake Outcome Measure for FOTO Hand Survey     Therapist reviewed FOTO scores for Marj Lazar on 12/14/2023.   FOTO report - see Media section or FOTO account episode details.     Intake Score (10/19): 90.0%  Intake Score (11/2): 93.3%  Intake Score (11/16) 68.3%  Intake Score (11/30): 91.7%  Intake Score (12/14): 92.5%          Treatment     Marj received the treatments listed below:      therapeutic exercises to develop strength, endurance, and ROM for 30 minutes including:  - 3x 30 sec hold for wrist extension/flexion stretching   - 3x 10 reps of bilateral forearm supination/pronation using 2# dumbbell  - 3x 10 reps of L wrist flexion and extension using 2# dumbbell  - 3x 10 reps of L active wrist radial and ulnar deviation using 2# dumbbell  - 3x 10 reps of gross  strengthening using medium hand gripper     Patient Education and Home Exercises     Education  provided:  - Progress towards goals      Assessment     Patient tolerated session well. Upgrade applied for deviation strengthening with Patient using 2# dumbbell and no reports of increase in pain, mostly mild challenge.     Marj is progressing well towards her goals and there are no updates to goals at this time. Pt prognosis is Good.     Patient will continue to benefit from skilled outpatient occupational therapy to address the deficits listed in the problem list on initial evaluation provide patient/family education and to maximize patient's level of independence in the home and community environment.     Patient's spiritual, cultural and educational needs considered and patient agreeable to plan of care and goals.    Anticipated barriers to occupational therapy: no barriers to note at this time    Goals:   - Pt will increase L  strength to 20# or more to improve participation with ADL and IADL tasks.--ongoing, progressing  - Pt will increase L key and 3 pt pinch strength by 3 psi to improve performance with cooking tasks and home management.--ongoing, progressing   - Pt will decrease time on 9HPT by 10 seconds with left hand  to improve fine motor speed and coordination needed to perform  home maintenance, grooming and hygiene--ongoing, progressing  - Pt will decrease pain to 1/10 on average to increase functional participation with meaningful occupations and work related tasks --ongoing, progressing    Plan     Updates/Grading for next session:continue with current plan of care allowing Patient's pain to guide each session as needed.     Saul Rodriguez OT   12/27/2023

## 2023-12-29 ENCOUNTER — CLINICAL SUPPORT (OUTPATIENT)
Dept: REHABILITATION | Facility: HOSPITAL | Age: 58
End: 2023-12-29
Payer: COMMERCIAL

## 2023-12-29 DIAGNOSIS — R29.898 LEFT HAND WEAKNESS: ICD-10-CM

## 2023-12-29 DIAGNOSIS — S50.12XA CONTUSION OF ELBOW AND FOREARM, LEFT, INITIAL ENCOUNTER: ICD-10-CM

## 2023-12-29 DIAGNOSIS — M79.602 LEFT ARM PAIN: Primary | ICD-10-CM

## 2023-12-29 PROCEDURE — 97530 THERAPEUTIC ACTIVITIES: CPT

## 2023-12-29 NOTE — PLAN OF CARE
[facility]  Occupational Therapy Plan of Care Note     Name: Marj Lazar  [facility] Number: [id number]    Therapy Diagnosis:   Encounter Diagnoses   Name Primary?    Left arm pain Yes    Contusion of elbow and forearm, left, initial encounter     Left hand weakness      Physician: Oscar Chapa MD    Visit Date: 12/29/2023    Physician Orders: Eval and Treat  Medical Diagnosis: S50.12XA-- Contusion, forearm and elbow, left, initial encounter   Evaluation Date: 10/19/2023  Insurance Authorization Period Expiration: 1/12/24  Plan of Care Certification Period: 1/12/24  Date of Return to MD: N/A  Visit # / Visits authorized: 8 / 12  FOTO: 5 / 3        Time In: 0930  Time Out: 1000  Total Billable Time: 30 minutes    Subjective     Update: 4/10 pain in distal Left upper extremity, this pain is mainly experienced in the proximal forearm region.   Patient experiences more weakness with daily routines with heaviness and fatigue as her main descriptors.     Objective      Update: Elbow and Wrist ROM. Measured in degrees.    10/19/2023 11/16/23 12/14/23     Left Left Left   Supination/Pronation Sup: 40  Pron: full Sup: 50 with stretch sensation Sup: full Range of Motion with no difficulty   Wrist Ext/Flex Full with pain Flx: full with no pain  Ext: mild pull/pop with full  Full Range of Motion              Strength (Dyanmometer) and Pinch Strength (Pinch Gauge)  Measured in pounds and psi. Average of three trials.    10/19/2023 10/19/2023 11/16/23 12/14/23 12/29/23     Right  Left  Left Left Left   Rung II 34.3# unable 2# 2.7# 5.7#   Key Pinch 4.2 psi .67 psi  1.2 psi .83 psi 1.8 psi   3pt Pinch 1.2 psi Unable  unable .33 psi .33 psi         Fine Motor Coordination: 9 Hole Peg Test  Right  10/19/23 Left    10/19/23 Left  11/16/23 Left  12/14/23 Left  12/29/23   22.04 sec 38.22 sec 52.65 sec 43.32 sec 43.04 sec        Intake Outcome Measure for FOTO Hand Survey     Therapist reviewed FOTO scores  for Marj Lazar on 12/14/2023.   FOTO report - see Media section or FOTO account episode details.     Intake Score (10/19): 90.0%  Intake Score (11/2): 93.3%  Intake Score (11/16) 68.3%  Intake Score (11/30): 91.7%  Intake Score (12/14): 92.5%  Intake Score (12/29) 70.8%            Assessment     Update: Patient demonstrates progress with her reported pain, decreasing from a 7/10 to 4/10. Overall Patient has demonstrated progress with L hand strength, however no increase is significant at this time. Discussing with Patient she reports she does feel at this time progress is being made as her ROM and pain is limited, she is motivated to continue with increasing her strengthening program to continue to progress towards functional independence with the L hand.     Reasons for Recertification of Therapy:   Patient would benefit from continued Occupational Therapy services to increase L hand strength allowing for further independence with daily routines. Without Occupational Therapy services Patient is at risk for increased pain, decreased strength, and decreased functional use of the L hand resulting in decreased quality of life.     GOALS  - Pt will increase L  strength to 20# or more to improve participation with ADL and IADL tasks.--ongoing, progressing  - Pt will increase L key and 3 pt pinch strength by 3 psi to improve performance with cooking tasks and home management.--ongoing, progressing   - Pt will decrease time on 9HPT by 10 seconds with left hand  to improve fine motor speed and coordination needed to perform  home maintenance, grooming and hygiene--ongoing, progressing  - Pt will decrease pain to 1/10 on average to increase functional participation with meaningful occupations and work related tasks --ongoing, progressing    Plan     Updated Certification Period: 12/29/23 to 1/12/24   Recommended Treatment Plan: 2 times per week for 8 weeks:  Therapeutic Activities and Therapeutic Exercise  Other  Recommendations: Continue with current plan of care with an emphasis on pinch and  strengthening     Saul Rodriguez, OT

## 2024-01-02 ENCOUNTER — CLINICAL SUPPORT (OUTPATIENT)
Dept: REHABILITATION | Facility: HOSPITAL | Age: 59
End: 2024-01-02
Payer: COMMERCIAL

## 2024-01-02 DIAGNOSIS — S50.12XA CONTUSION OF ELBOW AND FOREARM, LEFT, INITIAL ENCOUNTER: ICD-10-CM

## 2024-01-02 DIAGNOSIS — M79.602 LEFT ARM PAIN: Primary | ICD-10-CM

## 2024-01-02 DIAGNOSIS — R29.898 LEFT HAND WEAKNESS: ICD-10-CM

## 2024-01-02 PROCEDURE — 97110 THERAPEUTIC EXERCISES: CPT

## 2024-01-02 NOTE — PROGRESS NOTES
[facility]  Occupational Therapy Treatment Note     Date: 1/2/2024  Name: Marj Lazar  Clinic Number: [id number]    Therapy Diagnosis:   Encounter Diagnoses   Name Primary?    Left arm pain Yes    Contusion of elbow and forearm, left, initial encounter     Left hand weakness      Physician: Oscar Chapa MD    Physician Orders: Eval and Treat  Medical Diagnosis: S50.12XA-- Contusion, forearm and elbow, left, initial encounter   Evaluation Date: 10/19/2023  Insurance Authorization Period Expiration: 1/12/24  Plan of Care Certification Period: 1/12/24  Date of Return to MD: N/A  Visit # / Visits authorized: 9 / 12  FOTO: 2/ 3        Time In: 0930  Time Out: 1005  Total Billable Time: 35 minutes      Subjective     Patient reports: mild pain in forearm near elbow joint since yesterday with an increase in pain during forearm strengthening with flexbar.   She was compliant with home exercise program given last session.   Response to previous treatment:good    Functional change: increased functional strength of the L hand     Pain: 3/10  Location: left hands      Objective     Intake Outcome Measure for FOTO Hand Survey     Therapist reviewed FOTO scores for Marj Lazar on 12/29/2023.   FOTO report - see Media section or FOTO account episode details.     Intake Score (10/19): 90.0%  Intake Score (11/2): 93.3%  Intake Score (11/16) 68.3%  Intake Score (11/30): 91.7%  Intake Score (12/14): 92.5%  Intake Score (12/29) 70.8%               Treatment     Marj received the treatments listed below:      therapeutic exercises to develop strength, endurance, and ROM for 35 minutes including:  - 3x 10 reps of bilateral forearm supination/pronation using yellow flex bar.  - 3x 10 reps of L wrist flexion/extension and radial/ulnar deviation using 2# dumbbell  - 2x 10 reps of gross  strengthening using medium hand gripper   - 3x 10 reps of key pinch using red resistive clip  3x 10 reps of 3 pt  pinch using yellow resistive clip     Patient Education and Home Exercises     Education provided:  - Progress towards goals      Assessment     Patient tolerated session well. When Patient began 3pt pinch strengthening she presented too weak to tolerate red resistive clip. Once 20 reps completed with yellow, Patient was able to upgrade to red to increase challenge. Patient completed 2 sets of gross  strengthening reporting significant fatigue following 20 reps.     Marj is progressing well towards her goals and there are no updates to goals at this time. Pt prognosis is Good.     Patient will continue to benefit from skilled outpatient occupational therapy to address the deficits listed in the problem list on initial evaluation provide patient/family education and to maximize patient's level of independence in the home and community environment.     Patient's spiritual, cultural and educational needs considered and patient agreeable to plan of care and goals.    Anticipated barriers to occupational therapy: no barriers to note at this time    Goals:   - Pt will increase L  strength to 20# or more to improve participation with ADL and IADL tasks.--ongoing, progressing  - Pt will increase L key and 3 pt pinch strength by 3 psi to improve performance with cooking tasks and home management.--ongoing, progressing   - Pt will decrease time on 9HPT by 10 seconds with left hand  to improve fine motor speed and coordination needed to perform  home maintenance, grooming and hygiene--ongoing, progressing  - Pt will decrease pain to 1/10 on average to increase functional participation with meaningful occupations and work related tasks --ongoing, progressing    Plan     Updates/Grading for next session:continue with current plan of care allowing Patient's pain to guide each session as needed.     Saul Rodriguez, OT   1/2/2024

## 2024-01-05 ENCOUNTER — CLINICAL SUPPORT (OUTPATIENT)
Dept: REHABILITATION | Facility: HOSPITAL | Age: 59
End: 2024-01-05
Payer: COMMERCIAL

## 2024-01-05 DIAGNOSIS — M79.602 LEFT ARM PAIN: Primary | ICD-10-CM

## 2024-01-05 DIAGNOSIS — S50.12XA CONTUSION OF ELBOW AND FOREARM, LEFT, INITIAL ENCOUNTER: ICD-10-CM

## 2024-01-05 DIAGNOSIS — R29.898 LEFT HAND WEAKNESS: ICD-10-CM

## 2024-01-05 PROCEDURE — 97110 THERAPEUTIC EXERCISES: CPT

## 2024-01-05 NOTE — PROGRESS NOTES
[facility]  Occupational Therapy Treatment Note     Date: 1/5/2024  Name: Marj Lazar  Clinic Number: [id number]    Therapy Diagnosis:   Encounter Diagnoses   Name Primary?    Left arm pain Yes    Contusion of elbow and forearm, left, initial encounter     Left hand weakness      Physician: Oscar Chapa MD    Physician Orders: Eval and Treat  Medical Diagnosis: S50.12XA-- Contusion, forearm and elbow, left, initial encounter   Evaluation Date: 10/19/2023  Insurance Authorization Period Expiration: 1/12/24  Plan of Care Certification Period: 1/12/24  Date of Return to MD: N/A  Visit # / Visits authorized: 9 / 12  FOTO: 2/ 3        Time In: 0930  Time Out: 1000  Total Billable Time: 30 minutes      Subjective     Patient reports: no increase in pain in L hand.   She was compliant with home exercise program given last session.   Response to previous treatment:good    Functional change: increased functional strength of the L hand     Pain: 3/10  Location: left hands      Objective     Intake Outcome Measure for FOTO Hand Survey     Therapist reviewed FOTO scores for Marj Lazar on 12/29/2023.   FOTO report - see Media section or FOTO account episode details.     Intake Score (10/19): 90.0%  Intake Score (11/2): 93.3%  Intake Score (11/16) 68.3%  Intake Score (11/30): 91.7%  Intake Score (12/14): 92.5%  Intake Score (12/29) 70.8%               Treatment     Marj received the treatments listed below:      therapeutic exercises to develop strength, endurance, and ROM for 30 minutes including:  - 3x 10 reps of L wrist flexion/extension and radial/ulnar deviation using 2# dumbbell  - 2x 10 reps of gross  strengthening using medium hand gripper   - 3x 10 reps of key and 3pt pinch using red resistive clip    Patient Education and Home Exercises     Education provided:  - Progress towards goals      Assessment     Patient tolerated session well. Tolerating strengthening program well with  continued motivation to increase resistance and weight for further progress.     Marj is progressing well towards her goals and there are no updates to goals at this time. Pt prognosis is Good.     Patient will continue to benefit from skilled outpatient occupational therapy to address the deficits listed in the problem list on initial evaluation provide patient/family education and to maximize patient's level of independence in the home and community environment.     Patient's spiritual, cultural and educational needs considered and patient agreeable to plan of care and goals.    Anticipated barriers to occupational therapy: no barriers to note at this time    Goals:   - Pt will increase L  strength to 20# or more to improve participation with ADL and IADL tasks.--ongoing, progressing  - Pt will increase L key and 3 pt pinch strength by 3 psi to improve performance with cooking tasks and home management.--ongoing, progressing   - Pt will decrease time on 9HPT by 10 seconds with left hand  to improve fine motor speed and coordination needed to perform  home maintenance, grooming and hygiene--ongoing, progressing  - Pt will decrease pain to 1/10 on average to increase functional participation with meaningful occupations and work related tasks --ongoing, progressing    Plan     Updates/Grading for next session:continue with current plan of care allowing Patient's pain to guide each session as needed.     Saul Rodriguez, OT   1/5/2024

## 2024-01-09 ENCOUNTER — CLINICAL SUPPORT (OUTPATIENT)
Dept: REHABILITATION | Facility: HOSPITAL | Age: 59
End: 2024-01-09
Payer: COMMERCIAL

## 2024-01-09 DIAGNOSIS — M79.602 LEFT ARM PAIN: Primary | ICD-10-CM

## 2024-01-09 DIAGNOSIS — R29.898 LEFT HAND WEAKNESS: ICD-10-CM

## 2024-01-09 DIAGNOSIS — S50.12XA CONTUSION OF ELBOW AND FOREARM, LEFT, INITIAL ENCOUNTER: ICD-10-CM

## 2024-01-09 PROCEDURE — 97110 THERAPEUTIC EXERCISES: CPT

## 2024-01-09 NOTE — PROGRESS NOTES
[facility]  Occupational Therapy Treatment Note     Date: 1/9/2024  Name: Marj Lazar  Clinic Number: [id number]    Therapy Diagnosis:   Encounter Diagnoses   Name Primary?    Left arm pain Yes    Contusion of elbow and forearm, left, initial encounter     Left hand weakness      Physician: Oscar Chapa MD    Physician Orders: Eval and Treat  Medical Diagnosis: S50.12XA-- Contusion, forearm and elbow, left, initial encounter   Evaluation Date: 10/19/2023  Insurance Authorization Period Expiration: 1/12/24  Plan of Care Certification Period: 1/12/24  Date of Return to MD: N/A  Visit # / Visits authorized: 10 / 12  FOTO: 2/ 3        Time In: 0930  Time Out: 1000  Total Billable Time: 30 minutes      Subjective     Patient reports: no increase in pain in L hand.   She was compliant with home exercise program given last session.   Response to previous treatment:good    Functional change: increased functional strength of the L hand     Pain: 3/10  Location: left forearm    Objective     Intake Outcome Measure for FOTO Hand Survey     Therapist reviewed FOTO scores for Marj Lazar on 12/29/2023.   FOTO report - see Media section or FOTO account episode details.     Intake Score (10/19): 90.0%  Intake Score (11/2): 93.3%  Intake Score (11/16) 68.3%  Intake Score (11/30): 91.7%  Intake Score (12/14): 92.5%  Intake Score (12/29) 70.8%               Treatment     Marj received the treatments listed below:      therapeutic exercises to develop strength, endurance, and ROM for 20 minutes including:  - 3x 10 reps of L wrist flexion/extension and radial/ulnar deviation using 2# dumbbell  - 2x 10 reps of gross  strengthening using medium hand gripper   - 3x 10 reps of key and 3pt pinch using red resistive clip    Therapeutic activity to reduce hypersensitivity to L forearm (alternating tapping and deep pressure) and hand (rice bucket).      Patient Education and Home Exercises     Education  provided:  - Progress towards goals   - Pt educated on scope and role of OT and rationale for OT services at this time.       Assessment     Patient tolerated session well. Tolerating strengthening program well with continued motivation to increase resistance and weight for further progress. Pt with no difficulty tolerating desensitization activity with L hand, however reported pain with L forearm desensitization activity. Pain decreased over duration of task.     Marj is progressing well towards her goals and there are no updates to goals at this time. Pt prognosis is Good.     Patient will continue to benefit from skilled outpatient occupational therapy to address the deficits listed in the problem list on initial evaluation provide patient/family education and to maximize patient's level of independence in the home and community environment.     Patient's spiritual, cultural and educational needs considered and patient agreeable to plan of care and goals.    Anticipated barriers to occupational therapy: no barriers to note at this time    Goals:   - Pt will increase L  strength to 20# or more to improve participation with ADL and IADL tasks.--ongoing, progressing  - Pt will increase L key and 3 pt pinch strength by 3 psi to improve performance with cooking tasks and home management.--ongoing, progressing   - Pt will decrease time on 9HPT by 10 seconds with left hand  to improve fine motor speed and coordination needed to perform  home maintenance, grooming and hygiene--ongoing, progressing  - Pt will decrease pain to 1/10 on average to increase functional participation with meaningful occupations and work related tasks --ongoing, progressing    Plan     Updates/Grading for next session:continue with current plan of care allowing Patient's pain to guide each session as needed.     Jonas Shipman, OT   1/9/2024

## 2024-01-11 ENCOUNTER — PATIENT MESSAGE (OUTPATIENT)
Dept: ORTHOPEDICS | Facility: CLINIC | Age: 59
End: 2024-01-11
Payer: COMMERCIAL

## 2024-01-11 ENCOUNTER — CLINICAL SUPPORT (OUTPATIENT)
Dept: REHABILITATION | Facility: HOSPITAL | Age: 59
End: 2024-01-11
Payer: COMMERCIAL

## 2024-01-11 DIAGNOSIS — R29.898 LEFT HAND WEAKNESS: ICD-10-CM

## 2024-01-11 DIAGNOSIS — M79.602 LEFT ARM PAIN: Primary | ICD-10-CM

## 2024-01-11 DIAGNOSIS — S50.12XA CONTUSION OF ELBOW AND FOREARM, LEFT, INITIAL ENCOUNTER: ICD-10-CM

## 2024-01-11 PROCEDURE — 97110 THERAPEUTIC EXERCISES: CPT

## 2024-01-11 NOTE — PLAN OF CARE
Outpatient Therapy Updated Plan of Care     Visit Date: 1/11/2024  Name: Marj Lazar  [facility] Number: [id number]    Therapy Diagnosis:   Encounter Diagnoses   Name Primary?    Left arm pain Yes    Contusion of elbow and forearm, left, initial encounter     Left hand weakness      Physician: Oscar Chapa MD    Physician Orders: Eval and Treat  Medical Diagnosis: S50.12XA-- Contusion, forearm and elbow, left, initial encounter   Evaluation Date: 10/19/2023  Insurance Authorization Period Expiration: 1/12/24  Plan of Care Certification Period: 1/12/24  Date of Return to MD: N/A  Visit # / Visits authorized: 11 / 12    Subjective     Update: 4/10 pain in distal Left upper extremity, this pain is mainly experienced in the proximal forearm region.   Patient experiences more weakness with daily routines with heaviness and fatigue as her main descriptors. States it is hard to hold and lift objects. Pt reports difficulty with household chores such as sweeping/mopping, making beds, washing heavy pots. States typing has improved however still experiencing numbness and tingling in hand and pain in wrist when typing for extended periods of time. 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 Pt is also unsure if she would be able to open medication packets repeatedly, hold Pt skin taut to admin injections, make an occupied bed- all tasks required by her profession.      Patient reports: improvement in symptoms since initial evaluation/last OT assessment.     Pain: 4 /10 to L forearm and wrist.    Patient's spiritual, cultural and educational needs considered and patient agreeable to plan of care and goals.    Objective     Update: Elbow and Wrist ROM. Measured in degrees.    10/19/2023 11/16/23 12/14/23 1/11/24     Left Left Left Left   Supination/Pronation Sup: 40  Pron: full Sup:  50 with stretch sensation Sup: full Range of Motion with no difficulty WNL   Wrist Ext/Flex Full with pain Flx: full with no pain  Ext: mild pull/pop with full  Full Range of Motion  WNL             Strength (Dyanmometer) and Pinch Strength (Pinch Gauge)  Measured in pounds and psi. Average of three trials.    10/19/2023 10/19/2023 11/16/23 12/14/23 12/29/23 1/11/24 1/11/24     Right  Left  Left Left Left Left Right   Rung II 34.3# unable 2# 2.7# 5.7# 11.7# 41#   Key Pinch 4.2 psi .67 psi  1.2 psi .83 psi 1.8 psi 2.26 psi 4.6 psi   3pt Pinch 1.2 psi Unable  unable .33 psi .33 psi 0.4 psi 1.4 psi         Fine Motor Coordination: 9 Hole Peg Test  Right  10/19/23 Left    10/19/23 Left  11/16/23 Left  12/14/23 Left  12/29/23 Left  1/11/24   22.04 sec 38.22 sec 52.65 sec 43.32 sec 43.04 sec   39 sec        Intake Outcome Measure for FOTO Hand Survey     Therapist reviewed FOTO scores for Marj Lazar on 12/14/2023.   FOTO report - see Media section or FOTO account episode details.     Intake Score (10/19): 90.0%  Intake Score (11/2): 93.3%  Intake Score (11/16) 68.3%  Intake Score (11/30): 91.7%  Intake Score (12/14): 92.5%  Intake Score (12/29) 70.8%          Written Home Exercise Program (HEP) Provided: Patient instructed to cont prior HEP.  Exercises were reviewed and Marj was able to demonstrate them prior to the end of the session.  Marj demonstrated good  understanding of the education provided.     Assessment      Update: Pt demonstrates increased strength and decreased pain over course of intervention thus far. Pt continues to demonstrate impairments in functional skills for ADLs and IADLs, as well as significant impairment for returning to work. Pt reports desire to continue with Occupational Therapy in order to increase functional independence with L hand. Pt motivated to return to OF.     Reasons for Recertification of Therapy:   Patient would benefit from continued Occupational Therapy services to  increase L hand strength allowing for further independence with daily routines. Without Occupational Therapy services Patient is at risk for increased pain, decreased strength, and decreased functional use of the L hand resulting in decreased quality of life.     Patient prognosis is Excellent.     Patient's spiritual, cultural and educational needs considered and pt agreeable to plan of care and goals.    Anticipated barriers to occupational therapy: extensive internal damage to L forearm    GOALS  - Pt will increase L  strength to 20# or more to improve participation with ADL and IADL tasks.--ongoing, progressing  - Pt will increase L key and 3 pt pinch strength by 3 psi to improve performance with cooking tasks and home management.--ongoing, progressing   - Pt will decrease time on 9HPT by 10 seconds with left hand  to improve fine motor speed and coordination needed to perform  home maintenance, grooming and hygiene--ongoing, progressing  - Pt will decrease pain to 1/10 on average to increase functional participation with meaningful occupations and work related tasks --ongoing, progressing    Plan     Updated Certification Period: TBD  Recommended Treatment Plan: 2 times per week for 8 weeks: Therapeutic Activities and Therapeutic Exercise  Other Recommendations: Continue with current plan of care with an emphasis on pinch and  strengthening      Jonas Shipman OT  1/11/2024      I CERTIFY THE NEED FOR THESE SERVICES FURNISHED UNDER THIS PLAN OF TREATMENT AND WHILE UNDER MY CARE    Physician's comments:        Physician's Signature: ___________________________________________________

## 2024-01-12 ENCOUNTER — TELEPHONE (OUTPATIENT)
Dept: ORTHOPEDICS | Facility: CLINIC | Age: 59
End: 2024-01-12
Payer: COMMERCIAL

## 2024-01-12 NOTE — TELEPHONE ENCOUNTER
Referral sent to dr. Ghotra per patient request due to Dr. Neal not accepting workers comp patients.     Faxed over  approval for eval and treat along with last office note.     Faxed to [phone]    Successful confirmation @ 9:20am.

## 2024-01-17 ENCOUNTER — OFFICE VISIT (OUTPATIENT)
Dept: ORTHOPEDICS | Facility: CLINIC | Age: 59
End: 2024-01-17
Payer: COMMERCIAL

## 2024-01-17 ENCOUNTER — TELEPHONE (OUTPATIENT)
Dept: ORTHOPEDICS | Facility: CLINIC | Age: 59
End: 2024-01-17

## 2024-01-17 DIAGNOSIS — G56.02 LEFT CARPAL TUNNEL SYNDROME: ICD-10-CM

## 2024-01-17 DIAGNOSIS — S50.12XA: Primary | ICD-10-CM

## 2024-01-17 PROCEDURE — 99214 OFFICE O/P EST MOD 30 MIN: CPT | Mod: ,,, | Performed by: ORTHOPAEDIC SURGERY

## 2024-01-17 RX ORDER — MELOXICAM 15 MG/1
15 TABLET ORAL DAILY
Qty: 30 TABLET | Refills: 0 | Status: SHIPPED | OUTPATIENT
Start: 2024-01-17

## 2024-01-17 NOTE — LETTER
[place] [place] [facility] [place] [address]  [place], [address]  Phone: [phone]  Fax: [phone]    Name:Marj Lazar  :1965   Date:2024     PATIENT IS UNABLE TO WORK AS OF: 24  [_] Pending treatment.  [x] For approximately [_] Days [7] Weeks [_] Months  [_] Pending diagnostic testing.  [_] Pending surgical treatment.  [_] For approximately _ months (Post Surgery)    PATIENT IS ABLE TO RETURN TO WORK AS OF: re-eval at next appt on 3/6/24      COMMENTS         Oscar Chapa MD / Magy Betts PA-C

## 2024-01-17 NOTE — PROGRESS NOTES
Subjective:    CC: Follow-up of the Left Hand (F/U for left carpal tunnel injection 12/6/23. Injection did ok. Pt states the pain isn't constant anymore and mainly hurt with certain movements and too much activity. Has more strength in hand. No other concerns with it.)       HPI:  Patient returns today for repeat exam.  Patient states hand is a little bit better.  She states the injection did help some.  She is waiting for a neurology appointment, she has this scheduled for next month.    ROS: Refer to HPI for pertinent ROS. All other 12 point systems negative.    Objective:  There were no vitals filed for this visit.     Physical Exam:  Left upper extremity compartment soft and warm.  Skin is intact.  There is no signs symptoms of DVT or infection.  She is nontender about the elbow wrist and hand today.  She is able make a full fist has full extension she is stable to stressing there is no crepitance instability.  She does have some hypersensitivity about the forearm region.  He is neurovascular intact distally.  There was no obvious thenar hypothenar wasting.    Images: . Images Reviewed and discussed with patient.    Assessment:  1. Contusion, forearm and elbow, left, initial encounter  - Ambulatory referral/consult to Physical/Occupational Therapy; Future    2. Left carpal tunnel syndrome  - Ambulatory referral/consult to Physical/Occupational Therapy; Future        Plan:  At this time we discussed her physical exam and previous imaging findings.  We have discussed Mobic with appropriate precautions.  She will continue physical therapy to regain her full strength and motion.  She will follow up with Neurology, I would like see him back in 6 weeks to see how she is progressing.    Follow UP: No follow-ups on file.

## 2024-01-23 LAB
PAP RECOMMENDATION EXT: NORMAL
PAP SMEAR: NORMAL

## 2024-01-25 DIAGNOSIS — S50.12XA: Primary | ICD-10-CM

## 2024-01-25 LAB
CHOLEST SERPL-MSCNC: 216 MG/DL (ref 0–200)
HDLC SERPL-MCNC: 75 MG/DL (ref 35–70)
LDLC SERPL CALC-MCNC: 119 MG/DL (ref 0–160)
TRIGL SERPL-MCNC: 127 MG/DL (ref 40–160)

## 2024-01-30 ENCOUNTER — CLINICAL SUPPORT (OUTPATIENT)
Dept: REHABILITATION | Facility: HOSPITAL | Age: 59
End: 2024-01-30
Payer: COMMERCIAL

## 2024-01-30 DIAGNOSIS — R29.898 LEFT HAND WEAKNESS: ICD-10-CM

## 2024-01-30 DIAGNOSIS — M79.602 LEFT ARM PAIN: Primary | ICD-10-CM

## 2024-01-30 DIAGNOSIS — S50.12XA CONTUSION OF ELBOW AND FOREARM, LEFT, INITIAL ENCOUNTER: ICD-10-CM

## 2024-01-30 PROCEDURE — 97110 THERAPEUTIC EXERCISES: CPT

## 2024-01-30 NOTE — PROGRESS NOTES
[facility]  Occupational Therapy Treatment Note     Date: 1/30/2024  Name: Marj Lazar  Clinic Number: [id number]    Therapy Diagnosis:   Encounter Diagnoses   Name Primary?    Left arm pain Yes    Contusion of elbow and forearm, left, initial encounter     Left hand weakness      Physician: Oscar Chapa MD    Physician Orders: Eval and Treat  Medical Diagnosis: S50.12XA-- Contusion, forearm and elbow, left, initial encounter   Evaluation Date: 10/19/2023  Insurance Authorization Period Expiration: 3/5/24  Plan of Care Certification Period: 3/5/24  Date of Return to MD: N/A  Visit # / Visits authorized: 1 / 12  FOTO: 2/ 3        Time In: 0930  Time Out: 1000  Total Billable Time: 30 minutes      Subjective     Patient reports: no increase in pain in L hand.   She was compliant with home exercise program given last session.   Response to previous treatment:good    Functional change: increased functional strength of the L hand     Pain: 4/10  Location: left forearm    Objective     Therapist administered Mill's Test and Cozen Test for lateral epicondylitis. Mill's Test with minimal response, strong positive response noted with Cozen Test.       Intake Outcome Measure for FOTO Hand Survey     Therapist reviewed FOTO scores for Marj Lazar on 12/29/2023.   FOTO report - see Media section or FOTO account episode details.     Intake Score (10/19): 90.0%  Intake Score (11/2): 93.3%  Intake Score (11/16) 68.3%  Intake Score (11/30): 91.7%  Intake Score (12/14): 92.5%  Intake Score (12/29) 70.8%               Treatment     Marj received the treatments listed below:      therapeutic exercises to develop strength, endurance, and ROM for 30 minutes including:  - 3x 10 reps of L wrist flexion/extension, forearm supination/pronation and radial/ulnar deviation using 2# dumbbell.   - Review of HEP for Lateral epicondylitis      Patient Education and Home Exercises     Education provided:  -  Progress towards goals   - Rationale for HEP for Lateral epicondylitis  - Pt educated on scope and role of OT and rationale for OT services at this time.       Assessment     Patient tolerated session well. Tolerating strengthening program well with continued motivation to increase resistance and weight for further progress. Pt tolerated special tests well, receptive to trialing HEP for Lateral epicondylitis. Therapist educated Pt that therapist was NOT diagnosing Pt with lateral epicondylitis, however based on Pt's response to Cozen test, therapy would progress with focus on addressing those areas of weakness and pain.    Marj is progressing well towards her goals and there are no updates to goals at this time. Pt prognosis is Good.     Patient will continue to benefit from skilled outpatient occupational therapy to address the deficits listed in the problem list on initial evaluation provide patient/family education and to maximize patient's level of independence in the home and community environment.     Patient's spiritual, cultural and educational needs considered and patient agreeable to plan of care and goals.    Anticipated barriers to occupational therapy: no barriers to note at this time    Goals:   - Pt will increase L  strength to 20# or more to improve participation with ADL and IADL tasks.--ongoing, progressing  - Pt will increase L key and 3 pt pinch strength by 3 psi to improve performance with cooking tasks and home management.--ongoing, progressing   - Pt will decrease time on 9HPT by 10 seconds with left hand  to improve fine motor speed and coordination needed to perform  home maintenance, grooming and hygiene--ongoing, progressing  - Pt will decrease pain to 1/10 on average to increase functional participation with meaningful occupations and work related tasks --ongoing, progressing    Plan     Updates/Grading for next session:continue with current plan of care.     Jonas Shipman, OT    1/30/2024

## 2024-02-01 ENCOUNTER — CLINICAL SUPPORT (OUTPATIENT)
Dept: REHABILITATION | Facility: HOSPITAL | Age: 59
End: 2024-02-01
Payer: COMMERCIAL

## 2024-02-01 DIAGNOSIS — M79.602 LEFT ARM PAIN: Primary | ICD-10-CM

## 2024-02-01 DIAGNOSIS — S50.12XA CONTUSION OF ELBOW AND FOREARM, LEFT, INITIAL ENCOUNTER: ICD-10-CM

## 2024-02-01 DIAGNOSIS — R29.898 LEFT HAND WEAKNESS: ICD-10-CM

## 2024-02-01 PROCEDURE — 97110 THERAPEUTIC EXERCISES: CPT

## 2024-02-01 NOTE — PROGRESS NOTES
[facility]  Occupational Therapy Treatment Note     Date: 2/1/2024  Name: Marj Lazar  Clinic Number: [id number]    Therapy Diagnosis:   Encounter Diagnoses   Name Primary?    Left arm pain Yes    Contusion of elbow and forearm, left, initial encounter     Left hand weakness      Physician: Oscar Chapa MD    Physician Orders: Eval and Treat  Medical Diagnosis: S50.12XA-- Contusion, forearm and elbow, left, initial encounter   Evaluation Date: 10/19/2023  Insurance Authorization Period Expiration: 3/5/24  Plan of Care Certification Period: 3/5/24  Date of Return to MD: N/A  Visit # / Visits authorized: 2 / 12  FOTO: 2/ 3        Time In: 0930  Time Out: 1000  Total Billable Time: 30 minutes      Subjective     Patient reports: she dropped a glass jar of garlic at the grocery store because it was so cold that it made her hand feel numb.    She was compliant with home exercise program given last session.   Response to previous treatment:good    Functional change: increased functional strength of the L hand as long as the item is not cold.     Pain: 3/10  Location: left forearm    Objective         Intake Outcome Measure for FOTO Hand Survey     Therapist reviewed FOTO scores for Marj Lazar on 12/29/2023.   FOTO report - see Media section or FOTO account episode details.     Intake Score (10/19): 12%  Intake Score (11/2): 9%  Intake Score (11/16): 36%  Intake Score (11/30): 11%  Intake Score (12/14): 10%  Intake Score (12/29): 34%  Intake Score (12/29): 26%      FOTO scores updated this date to reflect Pt's historical data in FOTO system.         Treatment     Marj received the treatments listed below:      therapeutic exercises to develop strength, endurance, and ROM for 30 minutes including:  - 3x 10 reps of L wrist flexion/extension on wedge using 2# dumbbell.   - 3x 10 reps of L forearm supination/pronation on wedge using 2# dumbbell.   - 3x 10 reps of L radial/ulnar deviation on  wedge using 2# dumbbell.   - Review of HEP   - Use of tennis ball for self-massage to L UE as needed.     Patient Education and Home Exercises     Education provided:  - Progress towards goals   - Review of HEP      Assessment     Patient tolerated session well. Pt reported frustration with cold tolerance with L hand. Pt with steady progress noted. No other concerns reported at this time.     Marj is progressing well towards her goals and there are no updates to goals at this time. Pt prognosis is Good.     Patient will continue to benefit from skilled outpatient occupational therapy to address the deficits listed in the problem list on initial evaluation provide patient/family education and to maximize patient's level of independence in the home and community environment.     Patient's spiritual, cultural and educational needs considered and patient agreeable to plan of care and goals.    Anticipated barriers to occupational therapy: no barriers to note at this time    Goals:   - Pt will increase L  strength to 20# or more to improve participation with ADL and IADL tasks.--ongoing, progressing  - Pt will increase L key and 3 pt pinch strength by 3 psi to improve performance with cooking tasks and home management.--ongoing, progressing   - Pt will decrease time on 9HPT by 10 seconds with left hand  to improve fine motor speed and coordination needed to perform  home maintenance, grooming and hygiene--ongoing, progressing  - Pt will decrease pain to 1/10 on average to increase functional participation with meaningful occupations and work related tasks --ongoing, progressing    Plan     Updates/Grading for next session:continue with current plan of care.     Jonas Shipman, OT   2/1/2024

## 2024-02-05 ENCOUNTER — CLINICAL SUPPORT (OUTPATIENT)
Dept: REHABILITATION | Facility: HOSPITAL | Age: 59
End: 2024-02-05
Payer: COMMERCIAL

## 2024-02-05 DIAGNOSIS — M79.602 LEFT ARM PAIN: Primary | ICD-10-CM

## 2024-02-05 DIAGNOSIS — S50.12XA CONTUSION OF ELBOW AND FOREARM, LEFT, INITIAL ENCOUNTER: ICD-10-CM

## 2024-02-05 DIAGNOSIS — R29.898 LEFT HAND WEAKNESS: ICD-10-CM

## 2024-02-05 PROCEDURE — 97530 THERAPEUTIC ACTIVITIES: CPT

## 2024-02-05 NOTE — PROGRESS NOTES
[facility]  Occupational Therapy Treatment Note     Date: 2/5/2024  Name: Marj Lazar  Clinic Number: [id number]    Therapy Diagnosis:   Encounter Diagnoses   Name Primary?    Left arm pain Yes    Contusion of elbow and forearm, left, initial encounter     Left hand weakness      Physician: Oscar Chapa MD    Physician Orders: Eval and Treat  Medical Diagnosis: S50.12XA-- Contusion, forearm and elbow, left, initial encounter   Evaluation Date: 10/19/2023  Insurance Authorization Period Expiration: 3/5/24  Plan of Care Certification Period: 3/5/24  Date of Return to MD: N/A  Visit # / Visits authorized: 3 / 12  FOTO: 2/ 3        Time In: 1000  Time Out: 1030  Total Billable Time: 30 minutes      Subjective     Patient reports: she continues to have aching in the L arm and it feels inflamed.  She was compliant with home exercise program given last session.   Response to previous treatment:good    Functional change: increased functional strength of the L hand as long as the item is not cold.     Pain: 3/10  Location: left forearm    Objective         Intake Outcome Measure for FOTO Hand Survey     Therapist reviewed FOTO scores for Marj Lazar on 12/29/2023.   FOTO report - see Media section or FOTO account episode details.     Intake Score (10/19): 12%  Intake Score (11/2): 9%  Intake Score (11/16): 36%  Intake Score (11/30): 11%  Intake Score (12/14): 10%  Intake Score (12/29): 34%  Intake Score (12/29): 26%      FOTO scores updated this date to reflect Pt's historical data in FOTO system.         Treatment     Marj received the treatments listed below:      therapeutic exercises to develop strength, endurance, and ROM for 30 minutes including:  - 3x 10 reps of L wrist flexion/extension on wedge using 2# dumbbell.   - 3x 10 reps of L forearm supination/pronation on wedge using 2# dumbbell.   - 3x 10 reps of L radial/ulnar deviation on wedge using 2# dumbbell.   - L wrist flexion  extension with finger extension and resisted press downs x20 reps each  - gross grasping with resistive ball x20 reps  - intrinsic strengthening with ball x20 reps  - Review of HEP   - Use of tennis ball for self-massage to L UE as needed.     Patient Education and Home Exercises     Education provided:  - Progress towards goals   - Review of HEP      Assessment     Patient tolerated session well. Pt reported frustration with cold tolerance with L hand. Pt with steady progress noted. No other concerns reported at this time.     Marj is progressing well towards her goals and there are no updates to goals at this time. Pt prognosis is Good.     Patient will continue to benefit from skilled outpatient occupational therapy to address the deficits listed in the problem list on initial evaluation provide patient/family education and to maximize patient's level of independence in the home and community environment.     Patient's spiritual, cultural and educational needs considered and patient agreeable to plan of care and goals.    Anticipated barriers to occupational therapy: no barriers to note at this time    Goals:   - Pt will increase L  strength to 20# or more to improve participation with ADL and IADL tasks.--ongoing, progressing  - Pt will increase L key and 3 pt pinch strength by 3 psi to improve performance with cooking tasks and home management.--ongoing, progressing   - Pt will decrease time on 9HPT by 10 seconds with left hand  to improve fine motor speed and coordination needed to perform  home maintenance, grooming and hygiene--ongoing, progressing  - Pt will decrease pain to 1/10 on average to increase functional participation with meaningful occupations and work related tasks --ongoing, progressing    Plan     Updates/Grading for next session:continue with current plan of care.     Lucrecia Elliott, OT   2/5/2024

## 2024-02-12 ENCOUNTER — CLINICAL SUPPORT (OUTPATIENT)
Dept: REHABILITATION | Facility: HOSPITAL | Age: 59
End: 2024-02-12
Payer: COMMERCIAL

## 2024-02-12 DIAGNOSIS — R29.898 LEFT HAND WEAKNESS: ICD-10-CM

## 2024-02-12 DIAGNOSIS — M79.602 LEFT ARM PAIN: Primary | ICD-10-CM

## 2024-02-12 DIAGNOSIS — S50.12XA CONTUSION OF ELBOW AND FOREARM, LEFT, INITIAL ENCOUNTER: ICD-10-CM

## 2024-02-12 PROCEDURE — 97530 THERAPEUTIC ACTIVITIES: CPT

## 2024-02-12 NOTE — PROGRESS NOTES
[facility]  Occupational Therapy Treatment Note     Date: 2/12/2024  Name: Marj Lazar  Clinic Number: [id number]    Therapy Diagnosis:   Encounter Diagnoses   Name Primary?    Left arm pain Yes    Contusion of elbow and forearm, left, initial encounter     Left hand weakness      Physician: Oscar Chapa MD    Physician Orders: Eval and Treat  Medical Diagnosis: S50.12XA-- Contusion, forearm and elbow, left, initial encounter   Evaluation Date: 10/19/2023  Insurance Authorization Period Expiration: 3/5/24  Plan of Care Certification Period: 3/5/24  Date of Return to MD: N/A  Visit # / Visits authorized: 4 / 12  FOTO: 2/ 3        Time In: 1000  Time Out: 1030  Total Billable Time: 30 minutes      Subjective     Patient reports: she continues to have aching in the L arm and it feels inflamed. + for neck pain.  She was compliant with home exercise program given last session.   Response to previous treatment:good    Functional change: increased functional strength of the L hand as long as the item is not cold.     Pain: 3/10  Location: left forearm    Objective         Intake Outcome Measure for FOTO Hand Survey     Therapist reviewed FOTO scores for Marj Lazar on 12/29/2023.   FOTO report - see Media section or FOTO account episode details.     Intake Score (10/19): 12%  Intake Score (11/2): 9%  Intake Score (11/16): 36%  Intake Score (11/30): 11%  Intake Score (12/14): 10%  Intake Score (12/29): 34%  Intake Score (12/29): 26%      FOTO scores updated this date to reflect Pt's historical data in FOTO system.         Treatment     Marj received the treatments listed below:      therapeutic exercises to develop strength, endurance, and ROM for 30 minutes including:  - 3x 10 reps of L wrist flexion/extension on wedge using 2# dumbbell.   - 3x 10 reps of L forearm supination/pronation on wedge using 2# dumbbell.   - 3x 10 reps of L radial/ulnar deviation on wedge using 2# dumbbell.   - L  wrist flexion extension with finger extension and resisted press downs x20 reps each  - gross grasping with resistive ball x20 reps; finger extension strengthening with yellow web x20 reps  - Review of HEP   - Use of tennis ball for self-massage to L UE as needed.     Patient Education and Home Exercises     Education provided:  - Progress towards goals   - Review of HEP      Assessment     Patient tolerated session well. Pt reported frustration with cold tolerance with L hand. Pt with steady progress noted. No other concerns reported at this time.     Marj is progressing well towards her goals and there are no updates to goals at this time. Pt prognosis is Good.     Patient will continue to benefit from skilled outpatient occupational therapy to address the deficits listed in the problem list on initial evaluation provide patient/family education and to maximize patient's level of independence in the home and community environment.     Patient's spiritual, cultural and educational needs considered and patient agreeable to plan of care and goals.    Anticipated barriers to occupational therapy: no barriers to note at this time    Goals:   - Pt will increase L  strength to 20# or more to improve participation with ADL and IADL tasks.--ongoing, progressing  - Pt will increase L key and 3 pt pinch strength by 3 psi to improve performance with cooking tasks and home management.--ongoing, progressing   - Pt will decrease time on 9HPT by 10 seconds with left hand  to improve fine motor speed and coordination needed to perform  home maintenance, grooming and hygiene--ongoing, progressing  - Pt will decrease pain to 1/10 on average to increase functional participation with meaningful occupations and work related tasks --ongoing, progressing    Plan     Updates/Grading for next session:continue with current plan of care.     Lucrecia Elliott OT   2/12/2024

## 2024-02-20 ENCOUNTER — CLINICAL SUPPORT (OUTPATIENT)
Dept: REHABILITATION | Facility: HOSPITAL | Age: 59
End: 2024-02-20
Payer: COMMERCIAL

## 2024-02-20 DIAGNOSIS — M79.602 LEFT ARM PAIN: Primary | ICD-10-CM

## 2024-02-20 DIAGNOSIS — R29.898 LEFT HAND WEAKNESS: ICD-10-CM

## 2024-02-20 DIAGNOSIS — S50.12XA CONTUSION OF ELBOW AND FOREARM, LEFT, INITIAL ENCOUNTER: ICD-10-CM

## 2024-02-20 PROCEDURE — 97110 THERAPEUTIC EXERCISES: CPT

## 2024-02-20 NOTE — PLAN OF CARE
Outpatient Therapy Updated Plan of Care     Visit Date: 2/20/2024  Name: Marj Lazar  [facility] Number: [id number]    Therapy Diagnosis:   Encounter Diagnoses   Name Primary?    Left arm pain Yes    Contusion of elbow and forearm, left, initial encounter     Left hand weakness      Physician: Oscar Chapa MD    Physician Orders: Eval and Treat  Medical Diagnosis: S50.12XA-- Contusion, forearm and elbow, left, initial encounter   Evaluation Date: 10/19/2023  Insurance Authorization Period Expiration: 3/5/24  Plan of Care Certification Period: 3/11/24  Date of Return to MD: N/A  Visit # / Visits authorized: 5 / 12      Subjective     Update: Pt reports decrease in average level of pain to 3/10. Pt reports she can tell she's getting stronger. Pt reports she saw neurologist yesterday and has new Rx for gabapentin.     Patient reports: improvement in symptoms since initial evaluation/last OT assessment.     Pain: 8 /10 to average 3/10.    Patient's spiritual, cultural and educational needs considered and patient agreeable to plan of care and goals.    Objective     Update: Elbow and Wrist ROM. Measured in degrees.    10/19/2023 11/16/23 12/14/23 1/11/24 2/20/2024     Left Left Left Left Left   Supination/Pronation Sup: 40  Pron: full Sup: 50 with stretch sensation Sup: full Range of Motion with no difficulty WNL WNL   Wrist Ext/Flex Full with pain Flx: full with no pain  Ext: mild pull/pop with full  Full Range of Motion  WNL WNL             Strength (Dyanmometer) and Pinch Strength (Pinch Gauge)  Measured in pounds and psi. Average of three trials.    10/19/2023 10/19/2023 11/16/23 12/14/23 12/29/23 1/11/24 2/20/24 1/11/24     Right  Left  Left Left Left Left Left Right   Rung II 34.3# unable 2# 2.7# 5.7# 11.7# 20# 41#   Key Pinch 4.2 psi .67 psi  1.2 psi .83 psi 1.8 psi 2.26 psi 3.34 psi 4.6 psi   3pt Pinch 1.2 psi Unable  unable .33 psi .33 psi 0.4 psi 1 psi 1.4 psi         Fine Motor Coordination: 9 Hole Peg  Test  Right  10/19/23 Left    10/19/23 Left  11/16/23 Left  12/14/23 Left  12/29/23 Left  1/11/24 Left 2/20/24   22.04 sec 38.22 sec 52.65 sec 43.32 sec 43.04 sec   39 sec 32.84 sec          Intake Outcome Measure for FOTO Hand Survey     Therapist reviewed FOTO scores for Marj Lazar on 2/1/2024.   FOTO report - see Media section or FOTO account episode details.       Intake Score (10/19): 12%  Intake Score (11/2): 9%  Intake Score (11/16): 36%  Intake Score (11/30): 11%  Intake Score (12/14): 10%  Intake Score (12/29): 34%  Intake Score (2/1/24): 26%      FOTO scores updated to reflect Pt's historical data in FOTO system.        Home Exercises and Patient Education   Education provided:   - Progress towards goals   - Review of HEP    Written Home Exercise Program (HEP) Provided: Patient instructed to cont prior HEP.  Exercises were reviewed and Marj was able to demonstrate them prior to the end of the session.  Marj demonstrated good  understanding of the education provided.     Assessment     Update: Pt has demonstrated improvements in function, pain level and strength over course of Occupational Therapy. Pt with steady progress noted towards goals and has met 1/4 goals to date. 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 Pt reports improvements noted in all areas at home.     Reasons for Recertification of Therapy:   Patient would benefit from continued Occupational Therapy services to increase L hand strength allowing for further independence with daily routines. Without Occupational Therapy services Patient is at risk for increased pain, decreased strength, and decreased functional use of the L hand resulting in decreased quality of life. Pt has demonstrated improvements and demonstrates good motivation to return to work.     Patient prognosis is Excellent.     Patient's spiritual, cultural and educational needs  considered and pt agreeable to plan of care and goals.    Anticipated barriers to occupational therapy: none at this time    Goals:   - Pt will increase L  strength to 20# or more to improve participation with ADL and IADL tasks.--MET  - Pt will increase L key and 3 pt pinch strength by 3 psi to improve performance with cooking tasks and home management.--ongoing, progressing   - Pt will decrease time on 9HPT by 10 seconds with left hand  to improve fine motor speed and coordination needed to perform  home maintenance, grooming and hygiene--ongoing, progressing  - Pt will decrease pain to 1/10 on average to increase functional participation with meaningful occupations and work related tasks --ongoing, progressing    Plan     Updated Certification Period: 3/5/2024 to 4/30/2024  Recommended Treatment Plan: 2 times per week for 8 weeks: Manual Therapy, Neuromuscular Re-ed, Patient Education, Self Care, Therapeutic Activities, and Therapeutic Exercise      Jonas Shipman, OT  2/20/2024      I CERTIFY THE NEED FOR THESE SERVICES FURNISHED UNDER THIS PLAN OF TREATMENT AND WHILE UNDER MY CARE    Physician's comments:        Physician's Signature: ___________________________________________________

## 2024-02-22 ENCOUNTER — CLINICAL SUPPORT (OUTPATIENT)
Dept: REHABILITATION | Facility: HOSPITAL | Age: 59
End: 2024-02-22
Payer: COMMERCIAL

## 2024-02-22 DIAGNOSIS — R29.898 LEFT HAND WEAKNESS: ICD-10-CM

## 2024-02-22 DIAGNOSIS — M79.602 LEFT ARM PAIN: Primary | ICD-10-CM

## 2024-02-22 DIAGNOSIS — S50.12XA CONTUSION OF ELBOW AND FOREARM, LEFT, INITIAL ENCOUNTER: ICD-10-CM

## 2024-02-22 PROCEDURE — 97110 THERAPEUTIC EXERCISES: CPT

## 2024-02-22 NOTE — PROGRESS NOTES
[facility]  Occupational Therapy Treatment Note     Date: 2/22/2024  Name: Marj Lazar  Clinic Number: [id number]    Therapy Diagnosis:   Encounter Diagnoses   Name Primary?    Left arm pain Yes    Contusion of elbow and forearm, left, initial encounter     Left hand weakness      Physician: Oscar Chapa MD    Physician Orders: Eval and Treat  Medical Diagnosis: S50.12XA-- Contusion, forearm and elbow, left, initial encounter   Evaluation Date: 10/19/2023  Insurance Authorization Period Expiration: 3/5/24  Plan of Care Certification Period: 3/5/24  Date of Return to MD: N/A  Visit # / Visits authorized: 6 / 12  FOTO: 2/ 3        Time In: 0930  Time Out: 1000  Total Billable Time: 30 minutes      Subjective     Patient reports: she has no pain with use of gabapentin 300 mg x1 daily.   She was compliant with home exercise program given last session.   Response to previous treatment:good    Functional change: increased strength in L hand     Pain: 0/10  Location: n/a    Objective         Intake Outcome Measure for FOTO Hand Survey     Therapist reviewed FOTO scores for Marj Lazar on 12/29/2023.   FOTO report - see Media section or FOTO account episode details.     Intake Score (10/19): 12%  Intake Score (11/2): 9%  Intake Score (11/16): 36%  Intake Score (11/30): 11%  Intake Score (12/14): 10%  Intake Score (12/29): 34%  Intake Score (12/29): 26%  Intake Score (2/22): 45%           Treatment     Marj received the treatments listed below:      therapeutic exercises to develop strength, endurance, and ROM for 30 minutes including:  - use of therapy putty with bead search for fine motor strengthening and increased coordination  - Review of HEP     Patient Education and Home Exercises     Education provided:  - Progress towards goals   - Review of HEP      Assessment     Patient tolerated session well. Pt reported good pain management with gabapentin for L hand. Pt with steady progress  noted.     Marj is progressing well towards her goals and there are no updates to goals at this time. Pt prognosis is Good.     Patient will continue to benefit from skilled outpatient occupational therapy to address the deficits listed in the problem list on initial evaluation provide patient/family education and to maximize patient's level of independence in the home and community environment.     Patient's spiritual, cultural and educational needs considered and patient agreeable to plan of care and goals.    Anticipated barriers to occupational therapy: no barriers to note at this time    Goals:   - Pt will increase L  strength to 20# or more to improve participation with ADL and IADL tasks.--ongoing, progressing  - Pt will increase L key and 3 pt pinch strength by 3 psi to improve performance with cooking tasks and home management.--ongoing, progressing   - Pt will decrease time on 9HPT by 10 seconds with left hand  to improve fine motor speed and coordination needed to perform  home maintenance, grooming and hygiene--ongoing, progressing  - Pt will decrease pain to 1/10 on average to increase functional participation with meaningful occupations and work related tasks --ongoing, progressing    Plan     Updates/Grading for next session:continue with current plan of care.       Jonas Shipman, OT   2/22/2024

## 2024-02-27 ENCOUNTER — CLINICAL SUPPORT (OUTPATIENT)
Dept: REHABILITATION | Facility: HOSPITAL | Age: 59
End: 2024-02-27
Payer: COMMERCIAL

## 2024-02-27 DIAGNOSIS — M79.602 LEFT ARM PAIN: Primary | ICD-10-CM

## 2024-02-27 DIAGNOSIS — S50.12XA CONTUSION OF ELBOW AND FOREARM, LEFT, INITIAL ENCOUNTER: ICD-10-CM

## 2024-02-27 DIAGNOSIS — R29.898 LEFT HAND WEAKNESS: ICD-10-CM

## 2024-02-27 PROCEDURE — 97110 THERAPEUTIC EXERCISES: CPT

## 2024-02-27 NOTE — PLAN OF CARE
[facility]  Occupational Therapy Discharge Note    Name: Marj Lazar  [facility] Number: [id number]    Physician:Oscar Chapa MD    Physician Orders: OT Eval and Treat  Medical Diagnosis from Referral: S50.12XA-- Contusion, forearm and elbow, left, initial encounter    Therapy Diagnosis:  Encounter Diagnoses   Name Primary?    Left arm pain Yes    Contusion of elbow and forearm, left, initial encounter     Left hand weakness      Evaluation Date: 10/19/2023  Authorization Period Expiration: 3/5/24  Plan of Care Expiration: 3/5/24  Visit Received  / Visits authorized: 7/12  Total visits received: 31  Date of Last visit: 02/27/2024    Time In: 0930  Time Out: 1000  Duration of treatment: 30 minutes    SUBJECTIVE     Pt reports: Pain levels are greatly reduced with use of gabapentin.   Pt reports compliance with HEP outside of therapy since last session    Pain level: 0/10  Location of pain: n/a      Functional change since beginning Occupational Therapy:  Pt with increased strength, AROM with L wrist, decreased pain resulting in increased ability to complete all tasks.      OBJECTIVE     Update: Elbow and Wrist ROM. Measured in degrees.    10/19/2023 11/16/23 12/14/23 1/11/24 2/20/2024     Left Left Left Left Left   Supination/Pronation Sup: 40  Pron: full Sup: 50 with stretch sensation Sup: full Range of Motion with no difficulty WNL WNL   Wrist Ext/Flex Full with pain Flx: full with no pain  Ext: mild pull/pop with full  Full Range of Motion  WNL WNL             Strength (Dyanmometer) and Pinch Strength (Pinch Gauge)  Measured in pounds and psi. Average of three trials.    10/19/2023 10/19/2023 11/16/23 12/14/23 12/29/23 1/11/24 2/20/24 1/11/24 2/27/24 2/27/24     Right  Left  Left Left Left Left Left Right Left Right   Rung II 34.3# unable 2# 2.7# 5.7# 11.7# 20# 41# 36# 57#   [place] Pinch 4.2 psi .67 psi  1.2 psi .83 psi 1.8 psi 2.26 psi 3.34 psi 4.6 psi 6.1 psi 8.5 psi   3pt Pinch 1.2  psi Unable  unable .33 psi .33 psi 0.4 psi 1 psi 1.4 psi 4.1 psi 7.2 psi         Fine Motor Coordination: 9 Hole Peg Test  Right  10/19/23 Left    10/19/23 Left  11/16/23 Left  12/14/23 Left  12/29/23 Left  1/11/24 Left 2/20/24 Left  2/27/24   22.04 sec 38.22 sec 52.65 sec 43.32 sec 43.04 sec   39 sec 32.84 sec 25.75 sec           Intake Outcome Measure for FOTO Hand Survey     Therapist reviewed FOTO scores for Marj Lazar on 2/1/2024.   FOTO report - see Media section or FOTO account episode details.       Intake Score (10/19): 12%  Intake Score (11/2): 9%  Intake Score (11/16): 36%  Intake Score (11/30): 11%  Intake Score (12/14): 10%  Intake Score (12/29): 34%  Intake Score (2/1/24): 26%  Intake Score (2/27/24): 52%            ASSESSMENT      Marj Lazar presented to occupational therapy on 02/27/2024 for final visit of authorized duration. Marj has progressed well with therapy in the areas of  decreased pain, increased strength, increased ROM, and overall functional skills allowing pt to be less limited with daily activities as evidenced by improved functional hand FOTO score. Marj has met 4 /4 goals set at initial evaluation, and will continue to work at home towards personal goal(s) . Marj is independent with home exercise program and was given handouts throughout this episode of care to reference for continued wellness and physical fitness .     Contact information was given to patient in case any questions arise in the future or if therapy is needed. Patient's spiritual, cultural and educational needs considered and patient agreeable to plan of care and goals.    Discharge reason: Patient is now asymptomatic, Patient has met all of his/her goals, and Patient requested discharge.    Anticipated barriers to therapy: none reported     FOTO Score:   Initial = 12  Discharge = 52    Goals:  Patient has made good progress toward goals since initial evaluation.     - Pt will increase L  strength to 20#  or more to improve participation with ADL and IADL tasks.--MET  - Pt will increase L key and 3 pt pinch strength by 3 psi to improve performance with cooking tasks and home management.-- MET  - Pt will decrease time on 9HPT by 10 seconds with left hand  to improve fine motor speed and coordination needed to perform  home maintenance, grooming and hygiene--MET  - Pt will decrease pain to 1/10 on average to increase functional participation with meaningful occupations and work related tasks --MET        PLAN   This patient is discharged from Occupational Therapy. Patient instructed to follow up with referring provider as needed.       Jonas Shipman, OT

## 2024-03-06 ENCOUNTER — OFFICE VISIT (OUTPATIENT)
Dept: ORTHOPEDICS | Facility: CLINIC | Age: 59
End: 2024-03-06
Payer: COMMERCIAL

## 2024-03-06 VITALS
HEIGHT: 64 IN | HEART RATE: 87 BPM | DIASTOLIC BLOOD PRESSURE: 87 MMHG | BODY MASS INDEX: 25.61 KG/M2 | WEIGHT: 150 LBS | SYSTOLIC BLOOD PRESSURE: 131 MMHG

## 2024-03-06 DIAGNOSIS — S50.12XA: Primary | ICD-10-CM

## 2024-03-06 DIAGNOSIS — G56.02 LEFT CARPAL TUNNEL SYNDROME: ICD-10-CM

## 2024-03-06 PROCEDURE — 99213 OFFICE O/P EST LOW 20 MIN: CPT | Mod: ,,, | Performed by: ORTHOPAEDIC SURGERY

## 2024-03-06 RX ORDER — ESTRADIOL 0.1 MG/D
1 PATCH TRANSDERMAL
COMMUNITY
Start: 2024-02-27

## 2024-03-06 RX ORDER — GABAPENTIN 300 MG/1
300 CAPSULE ORAL DAILY
COMMUNITY
Start: 2024-02-20

## 2024-03-06 RX ORDER — PROGESTERONE 200 MG/1
200 CAPSULE ORAL NIGHTLY
COMMUNITY
Start: 2024-02-27

## 2024-03-06 NOTE — LETTER
[facility]  [address] [place] [address] [address]  [place], [address]  Phone: [phone]  Fax: [phone]    Name:Marj Lazar  :1965   Date:2024     PATIENT IS ABLE TO RETURN TO WORK AS OF: 3/11/24    [x] REGULAR DUTY: [x] No Restrictions. [_] With Restrictions (See comments below0:    COMMENTS     Oscar Chapa MD / Magy Betts PA-C

## 2024-03-07 ENCOUNTER — TELEPHONE (OUTPATIENT)
Dept: FAMILY MEDICINE | Facility: CLINIC | Age: 59
End: 2024-03-07
Payer: COMMERCIAL

## 2024-03-07 ENCOUNTER — PATIENT MESSAGE (OUTPATIENT)
Dept: FAMILY MEDICINE | Facility: CLINIC | Age: 59
End: 2024-03-07
Payer: COMMERCIAL

## 2024-03-07 NOTE — PROGRESS NOTES
"Subjective:    CC: Follow-up of the Left Wrist (L carpal tunnel inj 12.6.23 - pt went to see dr. Ghotra - she was put on gabapentin - she states that it is helping her. She is overall doing great. )       HPI:  Patient returns today for repeat exam.  Patient states she is doing much better.  She has been placed on gabapentin by her neurologist, in his doing well.    ROS: Refer to HPI for pertinent ROS. All other 12 point systems negative.    Objective:  Vitals:    03/06/24 1049   BP: 131/87   Pulse: 87   Weight: 68 kg (150 lb)   Height: 5' 4" (1.626 m)        Physical Exam:  Left upper extremity compartment soft and warm.  Skin is intact.  There is no signs symptoms of DVT or infection.  He is appropriate motion of the elbow and wrist.  She is able make a full fist has full extension, stable to stressing, neurovascular intact distally.    Images: . Images Reviewed and discussed with patient.    Assessment:  1. Contusion, forearm and elbow, left, initial encounter    2. Left carpal tunnel syndrome        Plan:  At this time we discussed her physical exam and previous imaging findings.  He is doing very well, she will follow up with her neurologist.  We have discussed her return to work.  I would like see back with any problems or difficulties.  We have discussed if her symptoms do return to call and return, history of carpal tunnel.  She is using her carpal tunnel splint as needed.    Follow UP: No follow-ups on file.              "

## 2024-03-08 ENCOUNTER — TELEPHONE (OUTPATIENT)
Dept: ORTHOPEDICS | Facility: CLINIC | Age: 59
End: 2024-03-08
Payer: COMMERCIAL

## 2024-03-08 ENCOUNTER — OFFICE VISIT (OUTPATIENT)
Dept: FAMILY MEDICINE | Facility: CLINIC | Age: 59
End: 2024-03-08
Payer: COMMERCIAL

## 2024-03-08 VITALS — BODY MASS INDEX: 25.61 KG/M2 | WEIGHT: 150 LBS | HEIGHT: 64 IN

## 2024-03-08 DIAGNOSIS — U07.1 COVID-19: Primary | ICD-10-CM

## 2024-03-08 PROCEDURE — 1160F RVW MEDS BY RX/DR IN RCRD: CPT | Mod: CPTII,95,, | Performed by: FAMILY MEDICINE

## 2024-03-08 PROCEDURE — 1159F MED LIST DOCD IN RCRD: CPT | Mod: CPTII,95,, | Performed by: FAMILY MEDICINE

## 2024-03-08 PROCEDURE — 99213 OFFICE O/P EST LOW 20 MIN: CPT | Mod: 95,,, | Performed by: FAMILY MEDICINE

## 2024-03-08 PROCEDURE — 3008F BODY MASS INDEX DOCD: CPT | Mod: CPTII,95,, | Performed by: FAMILY MEDICINE

## 2024-03-08 RX ORDER — BROMPHENIRAMINE MALEATE, PSEUDOEPHEDRINE HYDROCHLORIDE, AND DEXTROMETHORPHAN HYDROBROMIDE 2; 30; 10 MG/5ML; MG/5ML; MG/5ML
5 SYRUP ORAL EVERY 8 HOURS PRN
Qty: 120 ML | Refills: 0 | Status: SHIPPED | OUTPATIENT
Start: 2024-03-08 | End: 2024-03-13

## 2024-03-08 RX ORDER — FLUTICASONE PROPIONATE 50 MCG
1 SPRAY, SUSPENSION (ML) NASAL DAILY
Qty: 16 G | Refills: 0 | Status: SHIPPED | OUTPATIENT
Start: 2024-03-08

## 2024-03-08 NOTE — PROGRESS NOTES
The patient location is: [place]  The chief complaint leading to consultation is: covid    Visit type: audiovisual     Face to Face time with patient: 8  10 minutes of total time spent on the encounter, which includes face to face time and non-face to face time preparing to see the patient (eg, review of tests), Obtaining and/or reviewing separately obtained history, Documenting clinical information in the electronic or other health record, Independently interpreting results (not separately reported) and communicating results to the patient/family/caregiver, or Care coordination (not separately reported).         Each patient to whom he or she provides medical services by telemedicine is:  (1) informed of the relationship between the physician and patient and the respective role of any other health care provider with respect to management of the patient; and (2) notified that he or she may decline to receive medical services by telemedicine and may withdraw from such care at any time.    Notes:     Marj Lazar is a 58 y.o. female presents for covid-19. She tested positive for covid 2 days ago on home test. She c/o nasal congestion, headaches. NO longer afebrile. + cough taht is dry. Denies shortness breath or chest pain/palpitations. Taking tylenol and sudafed. She initially requested paxlovid but since she is feeling better, I do not recommend it. She agrees with this.     General: denies f/c, weight loss, night sweats, decreased appetite  Eye: denies blurred vision, changes in vision  Respiratory: denies sob, wheezing, cough  Cardiovascular: denies chest pain, palpitations, edema  Gastrointestinal: denies abdominal pain, n/v, constipation, diarrhea  Integumentary: denies rashes, pruritis      There were no vitals filed for this visit.    Problem List Items Addressed This Visit    None  Visit Diagnoses       COVID-19    -  Primary    Relevant Medications    fluticasone propionate (FLONASE) 50 mcg/actuation nasal  spray    brompheniramine-pseudoeph-DM (BROMFED DM) 2-30-10 mg/5 mL Syrp          Start flonase bid while having congestion  Will send in bromfed dm for cough  Continue otc meds prn symptoms, tylenol for headache  Strict er precautions given

## 2024-03-13 DIAGNOSIS — U07.1 COVID-19: ICD-10-CM

## 2024-03-13 RX ORDER — BROMPHENIRAMINE MALEATE, PSEUDOEPHEDRINE HYDROCHLORIDE, AND DEXTROMETHORPHAN HYDROBROMIDE 2; 30; 10 MG/5ML; MG/5ML; MG/5ML
SYRUP ORAL
Qty: 120 ML | Refills: 0 | Status: SHIPPED | OUTPATIENT
Start: 2024-03-13

## 2024-04-02 ENCOUNTER — DOCUMENTATION ONLY (OUTPATIENT)
Dept: FAMILY MEDICINE | Facility: CLINIC | Age: 59
End: 2024-04-02

## 2024-04-02 ENCOUNTER — OFFICE VISIT (OUTPATIENT)
Dept: FAMILY MEDICINE | Facility: CLINIC | Age: 59
End: 2024-04-02
Payer: COMMERCIAL

## 2024-04-02 VITALS
DIASTOLIC BLOOD PRESSURE: 85 MMHG | OXYGEN SATURATION: 98 % | HEART RATE: 93 BPM | HEIGHT: 64 IN | WEIGHT: 155 LBS | TEMPERATURE: 98 F | SYSTOLIC BLOOD PRESSURE: 131 MMHG | BODY MASS INDEX: 26.46 KG/M2 | RESPIRATION RATE: 18 BRPM

## 2024-04-02 DIAGNOSIS — F41.1 GAD (GENERALIZED ANXIETY DISORDER): ICD-10-CM

## 2024-04-02 DIAGNOSIS — I10 PRIMARY HYPERTENSION: Primary | ICD-10-CM

## 2024-04-02 DIAGNOSIS — F33.1 MODERATE EPISODE OF RECURRENT MAJOR DEPRESSIVE DISORDER: ICD-10-CM

## 2024-04-02 DIAGNOSIS — D75.89 MACROCYTOSIS WITHOUT ANEMIA: ICD-10-CM

## 2024-04-02 PROCEDURE — 3075F SYST BP GE 130 - 139MM HG: CPT | Mod: CPTII,,, | Performed by: FAMILY MEDICINE

## 2024-04-02 PROCEDURE — 3079F DIAST BP 80-89 MM HG: CPT | Mod: CPTII,,, | Performed by: FAMILY MEDICINE

## 2024-04-02 PROCEDURE — 1160F RVW MEDS BY RX/DR IN RCRD: CPT | Mod: CPTII,,, | Performed by: FAMILY MEDICINE

## 2024-04-02 PROCEDURE — 99214 OFFICE O/P EST MOD 30 MIN: CPT | Mod: ,,, | Performed by: FAMILY MEDICINE

## 2024-04-02 PROCEDURE — 1159F MED LIST DOCD IN RCRD: CPT | Mod: CPTII,,, | Performed by: FAMILY MEDICINE

## 2024-04-02 PROCEDURE — 3008F BODY MASS INDEX DOCD: CPT | Mod: CPTII,,, | Performed by: FAMILY MEDICINE

## 2024-04-02 RX ORDER — CHOLECALCIFEROL (VITAMIN D3) 50 MCG
1 TABLET ORAL DAILY
COMMUNITY

## 2024-04-02 RX ORDER — PRAZOSIN HYDROCHLORIDE 1 MG/1
1 CAPSULE ORAL NIGHTLY
COMMUNITY
Start: 2024-03-25

## 2024-04-02 RX ORDER — DULOXETIN HYDROCHLORIDE 30 MG/1
30 CAPSULE, DELAYED RELEASE ORAL DAILY
COMMUNITY
Start: 2024-03-26

## 2024-04-02 RX ORDER — ALPRAZOLAM 0.5 MG/1
0.5 TABLET ORAL 3 TIMES DAILY
COMMUNITY
Start: 2024-03-25
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
## 2024-04-03 ENCOUNTER — LAB VISIT (OUTPATIENT)
Dept: LAB | Facility: HOSPITAL | Age: 59
End: 2024-04-03
Attending: FAMILY MEDICINE
Payer: COMMERCIAL

## 2024-04-03 DIAGNOSIS — D75.89 MACROCYTOSIS WITHOUT ANEMIA: ICD-10-CM

## 2024-04-03 LAB
BASOPHILS # BLD AUTO: 0.03 X10(3)/MCL
BASOPHILS NFR BLD AUTO: 0.5 %
EOSINOPHIL # BLD AUTO: 0.23 X10(3)/MCL (ref 0–0.9)
EOSINOPHIL NFR BLD AUTO: 3.7 %
ERYTHROCYTE [DISTWIDTH] IN BLOOD BY AUTOMATED COUNT: 12.5 % (ref 11.5–17)
FOLATE SERPL-MCNC: 13.8 NG/ML (ref 7–31.4)
HCT VFR BLD AUTO: 37.9 % (ref 37–47)
HEMATOLOGIST REVIEW: NORMAL
HGB BLD-MCNC: 12.3 G/DL (ref 12–16)
IMM GRANULOCYTES # BLD AUTO: 0.03 X10(3)/MCL (ref 0–0.04)
IMM GRANULOCYTES NFR BLD AUTO: 0.5 %
LYMPHOCYTES # BLD AUTO: 1.52 X10(3)/MCL (ref 0.6–4.6)
LYMPHOCYTES NFR BLD AUTO: 24.8 %
MCH RBC QN AUTO: 33 PG (ref 27–31)
MCHC RBC AUTO-ENTMCNC: 32.5 G/DL (ref 33–36)
MCV RBC AUTO: 101.6 FL (ref 80–94)
MONOCYTES # BLD AUTO: 0.48 X10(3)/MCL (ref 0.1–1.3)
MONOCYTES NFR BLD AUTO: 7.8 %
NEUTROPHILS # BLD AUTO: 3.85 X10(3)/MCL (ref 2.1–9.2)
NEUTROPHILS NFR BLD AUTO: 62.7 %
NRBC BLD AUTO-RTO: 0 %
PLATELET # BLD AUTO: 348 X10(3)/MCL (ref 130–400)
PMV BLD AUTO: 8.5 FL (ref 7.4–10.4)
RBC # BLD AUTO: 3.73 X10(6)/MCL (ref 4.2–5.4)
RET# (OHS): 0.09 X10E6/UL (ref 0.02–0.08)
RETICULOCYTE COUNT AUTOMATED (OLG): 2.32 % (ref 1.1–2.1)
VIT B12 SERPL-MCNC: 584 PG/ML (ref 213–816)
WBC # SPEC AUTO: 6.14 X10(3)/MCL (ref 4.5–11.5)

## 2024-04-03 PROCEDURE — 36415 COLL VENOUS BLD VENIPUNCTURE: CPT

## 2024-04-03 PROCEDURE — 85025 COMPLETE CBC W/AUTO DIFF WBC: CPT

## 2024-04-03 PROCEDURE — 84165 PROTEIN E-PHORESIS SERUM: CPT

## 2024-04-03 PROCEDURE — 85045 AUTOMATED RETICULOCYTE COUNT: CPT

## 2024-04-03 PROCEDURE — 82607 VITAMIN B-12: CPT

## 2024-04-03 PROCEDURE — 82746 ASSAY OF FOLIC ACID SERUM: CPT

## 2024-04-04 DIAGNOSIS — D75.89 MACROCYTOSIS WITHOUT ANEMIA: Primary | ICD-10-CM

## 2024-04-04 LAB
ALBUMIN % SPEP (OHS): 50.8 (ref 48.1–59.5)
ALBUMIN SERPL BCP-MCNC: 3.5 G/DL
ALBUMIN/GLOB SERPL: 1.1 RATIO
ALPHA 1 GLOB (OHS): 0.22 GM/DL (ref 0–0.4)
ALPHA 1 GLOB% (OHS): 3.25 (ref 2.3–4.9)
ALPHA 2 GLOB % (OHS): 13.46 (ref 6.9–13)
ALPHA 2 GLOB (OHS): 0.92 GM/DL (ref 0.4–1)
BETA GLOB (OHS): 1.1 GM/DL (ref 0.7–1.3)
BETA GLOB% (OHS): 16.24 (ref 13.8–19.7)
GAMMA GLOBULIN % (OHS): 16.24 (ref 10.1–21.9)
GAMMA GLOBULIN (OHS): 1.1 GM/DL (ref 0.4–1.8)
GLOBULIN SER-MCNC: 3.3 GM/DL
M SPIKE % (OHS): ABNORMAL
M SPIKE (OHS): ABNORMAL
PATH REV: NORMAL
PROT SERPL-MCNC: 6.8 GM/DL (ref 5.8–7.6)

## 2024-04-04 NOTE — PROGRESS NOTES
The work up for enlarged blood cells was normal but I am placing a referral to hematology for further work up of red blood cells being large

## 2024-04-10 ENCOUNTER — TELEPHONE (OUTPATIENT)
Dept: FAMILY MEDICINE | Facility: CLINIC | Age: 59
End: 2024-04-10
Payer: COMMERCIAL

## 2024-04-10 NOTE — TELEPHONE ENCOUNTER
----- Message from Kelly Garces MD sent at 4/9/2024 12:00 PM CDT -----  Regarding: RE: New patient referral  Please let patient know that hematology reviewed the referral and does not feel like any further work up is necessary so they will not need to see her  ----- Message -----  From: Akira Bustillos MD  Sent: 4/9/2024   8:45 AM CDT  To: Kelly Garces MD  Subject: New patient referral                             Received a referral from you on this patient for macrocytosis. Chart and labs reviewed.  Hemoglobin 12.3 with an MCV of 101.6.  This is a nonspecific finding and not indicative of bone marrow dysfunction.  B12 and folate levels you ordered were normal.  B12 and folate deficiency usually cause an MCV >115 to 120.  This type of low-level elevation can also be seen with alcohol consumption.  She does not need any additional hematology workup.  Please let me know if there are any questions.    Akira Bustillos MD  [facility]

## 2024-06-19 ENCOUNTER — OFFICE VISIT (OUTPATIENT)
Dept: ORTHOPEDICS | Facility: CLINIC | Age: 59
End: 2024-06-19
Payer: COMMERCIAL

## 2024-06-19 DIAGNOSIS — G56.02 LEFT CARPAL TUNNEL SYNDROME: Primary | ICD-10-CM

## 2024-06-19 DIAGNOSIS — M65.9 TENOSYNOVITIS: ICD-10-CM

## 2024-06-19 PROCEDURE — 99214 OFFICE O/P EST MOD 30 MIN: CPT | Mod: 25,,, | Performed by: ORTHOPAEDIC SURGERY

## 2024-06-19 PROCEDURE — 20526 THER INJECTION CARP TUNNEL: CPT | Mod: LT,,, | Performed by: ORTHOPAEDIC SURGERY

## 2024-06-19 RX ADMIN — LIDOCAINE HYDROCHLORIDE 2 ML: 20 INJECTION, SOLUTION INFILTRATION; PERINEURAL at 09:06

## 2024-06-19 RX ADMIN — BETAMETHASONE SODIUM PHOSPHATE AND BETAMETHASONE ACETATE 6 MG: 3; 3 INJECTION, SUSPENSION INTRA-ARTICULAR; INTRALESIONAL; INTRAMUSCULAR; SOFT TISSUE at 09:06

## 2024-06-19 NOTE — PROCEDURES
Carpal Tunnel    Date/Time: 6/19/2024 9:00 AM    Performed by: Oscar Chapa MD  Authorized by: Oscar Chapa MD    Consent Done?:  Yes (Verbal)  Indications:  Pain  Site marked: the procedure site was marked    Timeout: prior to procedure the correct patient, procedure, and site was verified    Prep: patient was prepped and draped in usual sterile fashion      Location:  Wrist  Approach:  Volar  Medications:  2 mL LIDOcaine HCL 20 mg/ml (2%) 20 mg/mL (2 %); 6 mg betamethasone acetate-betamethasone sodium phosphate 6 mg/mL  Patient tolerance:  Patient tolerated the procedure well with no immediate complications     Additional Comments: 1.5cc of  2% lidocaine w/o epi & 1.5cc betamethasone.

## 2024-06-19 NOTE — PROGRESS NOTES
Subjective:    CC: Follow-up of the Left Hand (F/U for left hand pain. Pt states it's started hurting again since going back to work. Only relief she gets it her brace and Ice Hot. Swells up every now and then. Pt states neck is still hurting.)       HPI:  Patient returns today for repeat exam.  Patient states she is gone back to work, she is doing more physical labor, has had an increase in her pain in the left wrist and hand.  She states the numbness and tingling has also returned in her hand.  She does have a history of carpal tunnel.  She did get relief with the previous injection.  She is also having pain along the dorsal aspect of the thumb.    ROS: Refer to HPI for pertinent ROS. All other 12 point systems negative.    Objective:  There were no vitals filed for this visit.     Physical Exam:  Left hand and wrist compartment soft and warm.  Skin is intact.  There is no signs symptoms of DVT or infection.  She is point tender along the 1st extensor compartment positive Finkelstein exam negative CMC grind stable to stressing, she does have a positive Tinel's and Phalen's.  She has able make a full fist full extension, neurovascular intact distally.    Images: . Images Reviewed and discussed with patient.    Assessment:  1. Left carpal tunnel syndrome  - Carpal Tunnel    2. Tenosynovitis        Plan:  At this time we discussed her physical exam and previous imaging findings.  She tolerated a carpal tunnel injection very well to the left wrist.  We have also discussed a thumb spica splint for her tenosynovitis, de Quervain.  We have discussed anti-inflammatories with appropriate precautions, I would like see back in 6 weeks to see how she is progressing.    Follow UP: No follow-ups on file.    Carpal Tunnel    Date/Time: 6/19/2024 9:00 AM    Performed by: Oscar Chapa MD  Authorized by: Oscar Chapa MD    Consent Done?:  Yes (Verbal)  Indications:  Pain  Site marked: the procedure site was marked     Timeout: prior to procedure the correct patient, procedure, and site was verified    Prep: patient was prepped and draped in usual sterile fashion      Location:  Wrist  Approach:  Volar  Medications:  2 mL LIDOcaine HCL 20 mg/ml (2%) 20 mg/mL (2 %); 6 mg betamethasone acetate-betamethasone sodium phosphate 6 mg/mL  Patient tolerance:  Patient tolerated the procedure well with no immediate complications     Additional Comments: 1.5cc of  2% lidocaine w/o epi & 1.5cc betamethasone.

## 2024-06-20 RX ORDER — BETAMETHASONE SODIUM PHOSPHATE AND BETAMETHASONE ACETATE 3; 3 MG/ML; MG/ML
6 INJECTION, SUSPENSION INTRA-ARTICULAR; INTRALESIONAL; INTRAMUSCULAR; SOFT TISSUE
Status: DISCONTINUED | OUTPATIENT
Start: 2024-06-19 | End: 2024-06-20 | Stop reason: HOSPADM

## 2024-06-20 RX ORDER — LIDOCAINE HYDROCHLORIDE 20 MG/ML
2 INJECTION, SOLUTION INFILTRATION; PERINEURAL
Status: DISCONTINUED | OUTPATIENT
Start: 2024-06-19 | End: 2024-06-20 | Stop reason: HOSPADM

## 2024-07-02 ENCOUNTER — PATIENT MESSAGE (OUTPATIENT)
Dept: ORTHOPEDICS | Facility: CLINIC | Age: 59
End: 2024-07-02
Payer: COMMERCIAL

## 2024-08-07 ENCOUNTER — OFFICE VISIT (OUTPATIENT)
Dept: ORTHOPEDICS | Facility: CLINIC | Age: 59
End: 2024-08-07
Payer: COMMERCIAL

## 2024-08-07 VITALS
HEART RATE: 90 BPM | BODY MASS INDEX: 26.46 KG/M2 | SYSTOLIC BLOOD PRESSURE: 152 MMHG | HEIGHT: 64 IN | WEIGHT: 155 LBS | DIASTOLIC BLOOD PRESSURE: 91 MMHG

## 2024-08-07 DIAGNOSIS — G56.02 LEFT CARPAL TUNNEL SYNDROME: Primary | ICD-10-CM

## 2024-08-07 PROCEDURE — 99213 OFFICE O/P EST LOW 20 MIN: CPT | Mod: ,,, | Performed by: ORTHOPAEDIC SURGERY

## 2024-08-27 ENCOUNTER — PATIENT MESSAGE (OUTPATIENT)
Dept: ORTHOPEDICS | Facility: CLINIC | Age: 59
End: 2024-08-27
Payer: COMMERCIAL

## 2024-09-23 ENCOUNTER — TELEPHONE (OUTPATIENT)
Dept: FAMILY MEDICINE | Facility: CLINIC | Age: 59
End: 2024-09-23
Payer: COMMERCIAL

## 2024-09-23 ENCOUNTER — OFFICE VISIT (OUTPATIENT)
Dept: FAMILY MEDICINE | Facility: CLINIC | Age: 59
End: 2024-09-23
Payer: COMMERCIAL

## 2024-09-23 ENCOUNTER — LAB VISIT (OUTPATIENT)
Dept: LAB | Facility: HOSPITAL | Age: 59
End: 2024-09-23
Attending: FAMILY MEDICINE
Payer: COMMERCIAL

## 2024-09-23 ENCOUNTER — PATIENT OUTREACH (OUTPATIENT)
Facility: CLINIC | Age: 59
End: 2024-09-23
Payer: COMMERCIAL

## 2024-09-23 VITALS
HEART RATE: 84 BPM | WEIGHT: 155.88 LBS | HEIGHT: 64 IN | DIASTOLIC BLOOD PRESSURE: 94 MMHG | SYSTOLIC BLOOD PRESSURE: 162 MMHG | BODY MASS INDEX: 26.61 KG/M2 | OXYGEN SATURATION: 98 % | RESPIRATION RATE: 18 BRPM

## 2024-09-23 DIAGNOSIS — I10 PRIMARY HYPERTENSION: ICD-10-CM

## 2024-09-23 DIAGNOSIS — E55.9 VITAMIN D DEFICIENCY: ICD-10-CM

## 2024-09-23 DIAGNOSIS — Z12.31 ENCOUNTER FOR SCREENING MAMMOGRAM FOR BREAST CANCER: ICD-10-CM

## 2024-09-23 DIAGNOSIS — R31.29 MICROSCOPIC HEMATURIA: ICD-10-CM

## 2024-09-23 DIAGNOSIS — Z23 NEED FOR SHINGLES VACCINE: ICD-10-CM

## 2024-09-23 DIAGNOSIS — Z23 NEED FOR INFLUENZA VACCINATION: ICD-10-CM

## 2024-09-23 DIAGNOSIS — Z00.00 ENCOUNTER FOR WELLNESS EXAMINATION: Primary | ICD-10-CM

## 2024-09-23 DIAGNOSIS — M79.10 MYALGIA DUE TO STATIN: ICD-10-CM

## 2024-09-23 DIAGNOSIS — Z13.1 SCREENING FOR DIABETES MELLITUS: ICD-10-CM

## 2024-09-23 DIAGNOSIS — Z23 NEED FOR VACCINATION FOR ZOSTER: ICD-10-CM

## 2024-09-23 DIAGNOSIS — Z00.00 ENCOUNTER FOR WELLNESS EXAMINATION: ICD-10-CM

## 2024-09-23 DIAGNOSIS — T46.6X5A MYALGIA DUE TO STATIN: ICD-10-CM

## 2024-09-23 DIAGNOSIS — R05.1 ACUTE COUGH: ICD-10-CM

## 2024-09-23 DIAGNOSIS — Z11.59 NEED FOR HEPATITIS C SCREENING TEST: ICD-10-CM

## 2024-09-23 DIAGNOSIS — E78.2 MIXED HYPERLIPIDEMIA: ICD-10-CM

## 2024-09-23 DIAGNOSIS — D75.89 MACROCYTOSIS WITHOUT ANEMIA: ICD-10-CM

## 2024-09-23 PROBLEM — F33.1 MODERATE EPISODE OF RECURRENT MAJOR DEPRESSIVE DISORDER: Status: RESOLVED | Noted: 2024-04-02 | Resolved: 2024-09-23

## 2024-09-23 PROBLEM — F41.1 GAD (GENERALIZED ANXIETY DISORDER): Status: RESOLVED | Noted: 2024-04-02 | Resolved: 2024-09-23

## 2024-09-23 LAB
25(OH)D3+25(OH)D2 SERPL-MCNC: 26 NG/ML (ref 30–80)
ALBUMIN SERPL-MCNC: 3.7 G/DL (ref 3.4–4.8)
ALBUMIN/GLOB SERPL: 1.2 RATIO (ref 1.1–2)
ALP SERPL-CCNC: 62 UNIT/L (ref 40–150)
ALT SERPL-CCNC: 14 UNIT/L (ref 0–55)
ANION GAP SERPL CALC-SCNC: 9 MEQ/L
AST SERPL-CCNC: 16 UNIT/L (ref 5–34)
BACTERIA #/AREA URNS AUTO: ABNORMAL /HPF
BASOPHILS # BLD AUTO: 0.05 X10(3)/MCL
BASOPHILS NFR BLD AUTO: 0.8 %
BILIRUB SERPL-MCNC: 0.4 MG/DL
BILIRUB UR QL STRIP.AUTO: NEGATIVE
BUN SERPL-MCNC: 9.6 MG/DL (ref 9.8–20.1)
CALCIUM SERPL-MCNC: 9.2 MG/DL (ref 8.4–10.2)
CHLORIDE SERPL-SCNC: 107 MMOL/L (ref 98–107)
CHOLEST SERPL-MCNC: 265 MG/DL
CHOLEST/HDLC SERPL: 6 {RATIO} (ref 0–5)
CLARITY UR: ABNORMAL
CO2 SERPL-SCNC: 23 MMOL/L (ref 23–31)
COLOR UR AUTO: YELLOW
CREAT SERPL-MCNC: 0.88 MG/DL (ref 0.55–1.02)
CREAT/UREA NIT SERPL: 11
EOSINOPHIL # BLD AUTO: 0.51 X10(3)/MCL (ref 0–0.9)
EOSINOPHIL NFR BLD AUTO: 8 %
ERYTHROCYTE [DISTWIDTH] IN BLOOD BY AUTOMATED COUNT: 13 % (ref 11.5–17)
EST. AVERAGE GLUCOSE BLD GHB EST-MCNC: 105.4 MG/DL
GFR SERPLBLD CREATININE-BSD FMLA CKD-EPI: >60 ML/MIN/1.73/M2
GLOBULIN SER-MCNC: 3 GM/DL (ref 2.4–3.5)
GLUCOSE SERPL-MCNC: 85 MG/DL (ref 82–115)
GLUCOSE UR QL STRIP: NORMAL
HBA1C MFR BLD: 5.3 %
HCT VFR BLD AUTO: 39.1 % (ref 37–47)
HCV AB SERPL QL IA: NONREACTIVE
HDLC SERPL-MCNC: 48 MG/DL (ref 35–60)
HGB BLD-MCNC: 13 G/DL (ref 12–16)
HGB UR QL STRIP: ABNORMAL
IMM GRANULOCYTES # BLD AUTO: 0.01 X10(3)/MCL (ref 0–0.04)
IMM GRANULOCYTES NFR BLD AUTO: 0.2 %
KETONES UR QL STRIP: NEGATIVE
LDLC SERPL CALC-MCNC: 174 MG/DL (ref 50–140)
LEUKOCYTE ESTERASE UR QL STRIP: 500
LYMPHOCYTES # BLD AUTO: 2.64 X10(3)/MCL (ref 0.6–4.6)
LYMPHOCYTES NFR BLD AUTO: 41.3 %
MCH RBC QN AUTO: 33.5 PG (ref 27–31)
MCHC RBC AUTO-ENTMCNC: 33.2 G/DL (ref 33–36)
MCV RBC AUTO: 100.8 FL (ref 80–94)
MONOCYTES # BLD AUTO: 0.38 X10(3)/MCL (ref 0.1–1.3)
MONOCYTES NFR BLD AUTO: 5.9 %
MUCOUS THREADS URNS QL MICRO: ABNORMAL /LPF
NEUTROPHILS # BLD AUTO: 2.8 X10(3)/MCL (ref 2.1–9.2)
NEUTROPHILS NFR BLD AUTO: 43.8 %
NITRITE UR QL STRIP: NEGATIVE
NRBC BLD AUTO-RTO: 0 %
PH UR STRIP: 6.5 [PH]
PLATELET # BLD AUTO: 339 X10(3)/MCL (ref 130–400)
PMV BLD AUTO: 8.5 FL (ref 7.4–10.4)
POTASSIUM SERPL-SCNC: 3.4 MMOL/L (ref 3.5–5.1)
PROT SERPL-MCNC: 6.7 GM/DL (ref 5.8–7.6)
PROT UR QL STRIP: ABNORMAL
RBC # BLD AUTO: 3.88 X10(6)/MCL (ref 4.2–5.4)
RBC #/AREA URNS AUTO: ABNORMAL /HPF
SODIUM SERPL-SCNC: 139 MMOL/L (ref 136–145)
SP GR UR STRIP.AUTO: 1.03 (ref 1–1.03)
SQUAMOUS #/AREA URNS LPF: ABNORMAL /HPF
TRIGL SERPL-MCNC: 213 MG/DL (ref 37–140)
TSH SERPL-ACNC: 1.48 UIU/ML (ref 0.35–4.94)
UROBILINOGEN UR STRIP-ACNC: 2
VLDLC SERPL CALC-MCNC: 43 MG/DL
WBC # BLD AUTO: 6.39 X10(3)/MCL (ref 4.5–11.5)
WBC #/AREA URNS AUTO: ABNORMAL /HPF

## 2024-09-23 PROCEDURE — 99396 PREV VISIT EST AGE 40-64: CPT | Mod: 25,,, | Performed by: FAMILY MEDICINE

## 2024-09-23 PROCEDURE — 99214 OFFICE O/P EST MOD 30 MIN: CPT | Mod: 25,,, | Performed by: FAMILY MEDICINE

## 2024-09-23 PROCEDURE — 82306 VITAMIN D 25 HYDROXY: CPT

## 2024-09-23 PROCEDURE — 85025 COMPLETE CBC W/AUTO DIFF WBC: CPT

## 2024-09-23 PROCEDURE — 36415 COLL VENOUS BLD VENIPUNCTURE: CPT

## 2024-09-23 PROCEDURE — 1159F MED LIST DOCD IN RCRD: CPT | Mod: CPTII,,, | Performed by: FAMILY MEDICINE

## 2024-09-23 PROCEDURE — 90750 HZV VACC RECOMBINANT IM: CPT | Mod: ,,, | Performed by: FAMILY MEDICINE

## 2024-09-23 PROCEDURE — 3008F BODY MASS INDEX DOCD: CPT | Mod: CPTII,,, | Performed by: FAMILY MEDICINE

## 2024-09-23 PROCEDURE — 83036 HEMOGLOBIN GLYCOSYLATED A1C: CPT

## 2024-09-23 PROCEDURE — 80061 LIPID PANEL: CPT

## 2024-09-23 PROCEDURE — 3077F SYST BP >= 140 MM HG: CPT | Mod: CPTII,,, | Performed by: FAMILY MEDICINE

## 2024-09-23 PROCEDURE — 90656 IIV3 VACC NO PRSV 0.5 ML IM: CPT | Mod: ,,, | Performed by: FAMILY MEDICINE

## 2024-09-23 PROCEDURE — 86803 HEPATITIS C AB TEST: CPT

## 2024-09-23 PROCEDURE — 80053 COMPREHEN METABOLIC PANEL: CPT

## 2024-09-23 PROCEDURE — 90472 IMMUNIZATION ADMIN EACH ADD: CPT | Mod: ,,, | Performed by: FAMILY MEDICINE

## 2024-09-23 PROCEDURE — 81001 URINALYSIS AUTO W/SCOPE: CPT

## 2024-09-23 PROCEDURE — 3080F DIAST BP >= 90 MM HG: CPT | Mod: CPTII,,, | Performed by: FAMILY MEDICINE

## 2024-09-23 PROCEDURE — 90471 IMMUNIZATION ADMIN: CPT | Mod: ,,, | Performed by: FAMILY MEDICINE

## 2024-09-23 PROCEDURE — 84443 ASSAY THYROID STIM HORMONE: CPT

## 2024-09-23 PROCEDURE — 3044F HG A1C LEVEL LT 7.0%: CPT | Mod: CPTII,,, | Performed by: FAMILY MEDICINE

## 2024-09-23 RX ORDER — FLUVASTATIN 20 MG/1
20 CAPSULE ORAL NIGHTLY
Qty: 90 CAPSULE | Refills: 3 | Status: SHIPPED | OUTPATIENT
Start: 2024-09-23 | End: 2025-09-23

## 2024-09-23 RX ORDER — ERGOCALCIFEROL 1.25 MG/1
50000 CAPSULE ORAL
Qty: 4 CAPSULE | Refills: 11 | Status: SHIPPED | OUTPATIENT
Start: 2024-09-23 | End: 2025-09-23

## 2024-09-23 NOTE — LETTER
AUTHORIZATION FOR RELEASE OF CONFIDENTIAL INFORMATION      2024      Dear Dr Stephen Macias,    We are seeing Marj Lazar, date of birth 1965, in the clinic at [facility].  Kelly Garces MD is the patient's PCP. Marj Lazar has an outstanding lab/procedure at the time we reviewed his chart.  In order to help keep her health information updated, Marj has authorized us to request the following medical record(s):            Pap Smear           Please fax any records to Kelly Garces MD's at  [phone]    If you have any questions, please contact  Addison VELEZ,CCC @ [phone]             Patient Name: Marj Lazar  :1965  Patient Phone #:[phone]                 Marj Lazar  MRN: [id number]  : 1965  Age: 59 y.o.  Sex: female         Patient/Legal Guardian Signature  This signature was collected at 2024    Marj Lazar     Self  _______________________________   Printed Name/Relationship to Patient      Consent for Examination and Treatment: I hereby authorize the providers and employees of [facility] ([facility]) to provide medical treatment/services which includes, but is not limited to, performing and administering tests and diagnostic procedures that are deemed necessary, including, but not limited to, imaging examinations, blood tests and other laboratory procedures as may be required by the hospital, clinic, or may be ordered by my physician(s) or persons working under the general and/or special instructions of my physician(s).      I understand and agree that this consent covers all authorized persons, including but not limited to physicians, residents, nurse practitioners, physicians' assistants, specialists, consultants, student nurses, and independently contracted physicians, who are called upon by the physician in charge, to carry out the diagnostic procedures and medical or surgical treatment.     I hereby authorize [facility] to  retain or dispose of any specimens or tissue, should there be such remaining from any test or procedure.     I hereby authorize and give consent for [facility] providers and employees to take photographs, images or videotapes of such diagnostic, surgical or treatment procedures of Patient as may be required by [facility] or as may be ordered by a physician. I further acknowledge and agree that [facility] may use cameras or other devices for patient monitoring.     I am aware that the practice of medicine is not an exact science, and I acknowledge that no guarantees have been made to me as to the outcome of any tests, procedures or treatment.     Authorization for Release of Information: I understand that my insurance company and/or their agents may need information necessary to make determinations about payment/reimbursement. I hereby provide authorization to release to all insurance companies, their successors, assignees, other parties with whom they may have contracted, or others acting on their behalf, that are involved with payment for any hospital and/or clinic charges incurred by the patient, any information that they request and deem necessary for payment/reimbursement, and/or quality review.  I further authorize the release of my health information to physicians or other health care practitioners on staff who are involved in my health care now and in the future, and to other health care providers, entities, or institutions for the purpose of my continued care and treatment, including referrals.     REGISTRATION AUTHORIZATION  Form No. 87416 (Rev. 3/25/2024)    Page 1 of 3                       Medicare Patient's Certification and Authorization to Release Information and Payment Request:  I certify that the information given by me in applying for payment under Title XVIII of the Social Security Act is correct. I authorize any iyer of medical or other information about me to release to the [facility], or its intermediaries or carriers, any information needed for this or a related Medicare claim. I request that payment of authorized benefits be made on my behalf.     Assignment of Insurance Benefits:   I hereby authorize any and all insurance companies, health plans, defined   benefit plans, health insurers or any entity that is or may be responsible for payment of my medical expenses to pay all hospital and medical benefits now due, and to become due and payable to me under any hospital benefits, sick benefits, injury benefits or any other benefit for services rendered to me, including Major Medical Benefits, direct to [facility] and all independently contracted physicians. I assign any and all rights that I may have against any and all insurance companies, health plans, defined benefit plans, health insurers or any entity that is or may be responsible for payment of my medical expenses, including, but not limited to any right to appeal a denial of a claim, any right to bring any action, lawsuit, administrative proceeding, or other cause of action on my behalf. I specifically assign my right to pursue litigation against any and all insurance companies, health plans, defined benefit plans, health insurers or any entity that is or may be responsible for payment of my medical expenses based upon a refusal to pay charges.            E. Valuables: It is understood and agreed that [facility] is not liable for the damage to or loss of any money, jewelry,   documents, dentures, eye glasses, hearing aids, prosthetics, or other property of value.     F. Computer Equipment: I understand and agree that should I choose to use computer equipment owned by [facility] or if I choose to access the Internet via [facility]s network, I do so at my own risk. [facility] is not responsible for any damage to my computer equipment or to any damages of any type that might arise from my loss of equipment or data.     G. Acceptance  of Financial Responsibility:  I agree that in consideration of the services and   supplies that have been   or will be furnished to the patient, I am hereby obligated to pay all charges made for or on the account of the patient according to the standard rates (in effect at the time the services and supplies are delivered) established by [facility], including its Patient Financial Assistance Policy to the extent it is applicable. I understand that I am responsible for all charges, or portions thereof, not covered by insurance or other sources. Patient refunds will be distributed only after balances at all [facility] facilities are paid.     H. Communication Authorization:  I hereby authorize [facility] and its representatives, along with any billing service   or  who may work on their behalf, to contact me on   my cell phone and/or home phone using pre- recorded messages, artificial voice messages, automatic telephone dialing devices or other computer assisted technology, or by electronic      mail, text messaging, or by any other form of electronic communication. This includes, but is not limited to, appointment reminders, yearly physical exam reminders, preventive care reminders, patient campaigns, welcome calls, and calls about account balances on my account or any account on which I am listed as a guarantor. I understand I have the right to opt out of these communications at any time.      Relationship  Between  Facility and  Provider:      I understand that some, but not all, providers furnishing services to the patient are not employees or agents of [facility]. The patient is under the care and supervision of his/her attending physician, and it is the responsibility of the facility and its nursing staff to carry out the instructions of such physicians. It is the responsibility of the patient's physician/designee to obtain the patient's informed consent, when required, for medical or surgical treatment,  special diagnostic or therapeutic procedures, or hospital services rendered for the patient under the special instructions of the physician/designee.           REGISTRATION AUTHORIZATION  Form No. 55924 (Rev. 3/25/2024)    Page 2 of 3                       Immunizations: [facility] shares immunization information with state sponsored health departments to help you and your doctor keep track of your immunization records. By signing, you consent to have this information shared with the health department in your state:                                [place] - LINKS ([place] Immunization Network for Kids Statewide)                                [place] - MIIX ([place] Immunization Information eXchange)                                [place] - ImmPRINT (Immunization Patient Registry with Integrated Technology)     TERM: This authorization is valid for this and subsequent care/treatment I receive at [facility] and will remain valid unless/until revoked in writing by me.     [facility]: As used in this document, [facility] means all [facility] owned and managed facilities, including, but not limited to, all health centers, surgery centers, clinics, urgent care centers, and hospitals.         [facility] complies with applicable Federal civil rights laws and does not discriminate on the basis of race, color, national origin, age, disability, or sex.  ATENCIÓN: si habla español, tiene a guzmán disposición servicios gratuitos de asistencia lingüística. Frances nascimento [phone].  CHÚ Ý: N?u b?n nói Ti?ng Vi?t, có các d?ch v? h? tr? ngôn ng? mi?n phí dành cho b?n. G?i s? [phone].        REGISTRATION AUTHORIZATION  Form No. 74568 (Rev. 3/25/2024)   Page 3 of 3

## 2024-09-23 NOTE — PROGRESS NOTES
Patient ID: [id number]     Chief Complaint: Annual Exam (Wellness Exam)        HPI:     Marj Lazar is a 59 y.o. female here today for an annual wellness visit. Reviewed and discussed lab results. Overall she feels well. No other complaints today. Denies depression, si/hi, melena, hematochezia.       As a separate em visit, she has hematuria, hld, low vit d levels. Patient c/o myaglias with statin so stopped it one month ago and they went away. Denies gross hematuria. Admits to being out of ergocalciferol.     She forgot to take antihypertensives and bp is elevated today.    Also c/o dry cough x 1 month. Having to take sudafed daily which does help. Reports a postnasal drip and some heartburn occasionally. Takes nexium prn. Has flonase and uses it prn. Has been using zyrtec and allegra with little relief.     -------------------------------------    Anxiety    Hyperlipidemia    Hypertension        Past Surgical History:   Procedure Laterality Date    TONSILLECTOMY  1976    TUBAL LIGATION         Review of patient's allergies indicates:   Allergen Reactions    Amlodipine Swelling       Outpatient Medications Marked as Taking for the 9/23/24 encounter (Office Visit) with Kelly Garces MD   Medication Sig Dispense Refill    fluticasone propionate (FLONASE) 50 mcg/actuation nasal spray 1 spray (50 mcg total) by Each Nostril route once daily. 16 g 0    losartan-hydrochlorothiazide 100-25 mg (HYZAAR) 100-25 mg per tablet Take 1 tablet by mouth once daily. 90 tablet 3    [DISCONTINUED] atorvastatin (LIPITOR) 20 MG tablet Take 1 tablet (20 mg total) by mouth once daily. 90 tablet 3    [DISCONTINUED] ergocalciferol (ERGOCALCIFEROL) 50,000 unit Cap Take 50,000 Units by mouth every 7 days.       Current Facility-Administered Medications for the 9/23/24 encounter (Office Visit) with Kelly Garces MD   Medication Dose Route Frequency Provider Last Rate Last Admin    influenza (Flulaval, Fluzone, Fluarix) 45  mcg/0.5 mL IM vaccine (> or = 6 mo) 0.5 mL  0.5 mL Intramuscular 1 time in Clinic/HOD            Social History     Socioeconomic History    Marital status:    Tobacco Use    Smoking status: Never    Smokeless tobacco: Never   Substance and Sexual Activity    Alcohol use: Yes    Drug use: Never    Sexual activity: Yes     Partners: Male     Birth control/protection: Post-menopausal, None     Social Determinants of Health     Financial Resource Strain: Low Risk  (4/2/2024)    Overall Financial Resource Strain (CARDIA)     Difficulty of Paying Living Expenses: Not hard at all   Food Insecurity: No Food Insecurity (4/2/2024)    Hunger Vital Sign     Worried About Running Out of Food in the Last Year: Never true     Ran Out of Food in the Last Year: Never true   Transportation Needs: No Transportation Needs (4/2/2024)    PRAPARE - Transportation     Lack of Transportation (Medical): No     Lack of Transportation (Non-Medical): No   Physical Activity: Inactive (4/2/2024)    Exercise Vital Sign     Days of Exercise per Week: 0 days     Minutes of Exercise per Session: 0 min   Housing Stability: Unknown (4/2/2024)    Housing Stability Vital Sign     Unable to Pay for Housing in the Last Year: No     Unstable Housing in the Last Year: No        Family History   Problem Relation Name Age of Onset    Hypertension Mother      Heart disease Mother      Hypertension Father      Heart disease Father          Patient Care Team:  Kelly Garces MD as PCP - General (Family Medicine)       Subjective:     ROS    Constitutional: Denies Change in appetite. Denies Chills. Denies Fever. Denies Night sweats.  Eye: Denies changes in vision  ENT: Denies Decreased hearing. Denies Sore throat. Denies Swollen glands.  Respiratory: Denies Shortness of breath. Denies Shortness of breath with exertion. Denies Wheezing.  Cardiovascular: DeniesChest pain at rest. Denies Chest pain with exertion. Denies Irregular heartbeat. Denies  "Palpitations. Denies Edema.  Gastrointestinal: Denies Abdominal pain. DeniesDiarrhea. Denies Nausea. Denies Vomiting. Denies Hematemesis or Hematochezia.  Genitourinary: Denies Dysuria. Denies Urinary frequency. Denies Urinary urgency. Denies Blood in urine.  Endocrine: Denies Cold intolerance. Denies Excessive thirst. Denies Heat intolerance. Denies Weight loss. Denies Weight gain.  Musculoskeletal: Denies Painful joints. Denies Weakness.  Integumentary: Denies Rash. Denies Itching. Denies Dry skin.  Neurologic: Denies Dizziness. Denies Fainting. Denies Headache.  Psychiatric: Denies Depression. Denies Anxiety. Denies Suicidal/Homicidal ideations.    All Other ROS: Negative except as stated in HPI.       Objective:     BP (!) 162/94 (BP Location: Right arm, Patient Position: Sitting, BP Method: Small (Automatic))   Pulse 84   Resp 18   Ht 5' 4" (1.626 m)   Wt 70.7 kg (155 lb 14.4 oz)   LMP  (LMP Unknown)   SpO2 98%   BMI 26.76 kg/m²     Physical Exam    General: Alert and oriented, No acute distress.  Head: Normocephalic, Atraumatic.  Eye: Pupils are equal, round and reactive to light, Extraocular movements are intact, Sclera non-icteric.  Ears/Nose/Throat: TM+ light reflexes bilaterally. Normal, Mucosa moist,Clear. Teeth intact, no lesions of tongue, palate, mucosa.  Respiratory: Clear to auscultation bilaterally; No wheezes, rales or rhonchi, Non-labored respirations, Symmetrical chest wall expansion.  Cardiovascular: Regular rate and rhythm, S1/S2 normal, No murmurs, rubs or gallops.  Gastrointestinal: Soft, Non-tender, Non-distended, Normal bowel sounds, No palpable organomegaly.  Integumentary: Warm, Dry, Intact, No suspicious lesions or rashes.  Extremities: No clubbing, cyanosis or edema  Psychiatric: Normal interaction, Coherent speech, Euthymic mood, Appropriate affect       Assessment:     Problem List Items Addressed This Visit          Cardiac/Vascular    Primary hypertension    Overview     At " goal on hyzaar. Asymptomatic    Continue current Rx meds           Mixed hyperlipidemia    Overview     Stopped taking lipitor due to myalgias.     The 10-year ASCVD risk score (Darius FERGUSON, et al., 2019) is: 15.8%    Values used to calculate the score:      Age: 59 years      Sex: Female      Is Non- : Yes      Diabetic: No      Tobacco smoker: No      Systolic Blood Pressure: 152 mmHg      Is BP treated: Yes      HDL Cholesterol: 48 mg/dL      Total Cholesterol: 265 mg/dL      D/c lipitor  Start fluvastatin         Relevant Medications    fluvastatin (LESCOL) 20 MG capsule    Other Relevant Orders    Comprehensive Metabolic Panel    Lipid Panel    CBC Auto Differential    Comprehensive Metabolic Panel    Lipid Panel    Hemoglobin A1C       Renal/    Microscopic hematuria    Relevant Orders    CT Abdomen Pelvis W Wo Contrast    Urinalysis, Reflex to Urine Culture       Oncology    Macrocytosis without anemia    Overview     Chronic finding. Highest mcv was 105. Down to 103. Not on any meds that can cause this. Hematology declined referral                  Endocrine    Vitamin D deficiency    Overview     Vit d level remains low On weekly ergocalciferol.     Continue current Rx meds           Relevant Medications    ergocalciferol (ERGOCALCIFEROL) 50,000 unit Cap    Other Relevant Orders    Vitamin D       Orthopedic    Myalgia due to statin     Other Visit Diagnoses       Encounter for wellness examination    -  Primary    Relevant Orders    Mammo Digital Screening Bilat w/ Zackery    CBC Auto Differential    Comprehensive Metabolic Panel    Lipid Panel    TSH    Hemoglobin A1C    Urinalysis    Vitamin D    Need for influenza vaccination        Relevant Medications    influenza (Flulaval, Fluzone, Fluarix) 45 mcg/0.5 mL IM vaccine (> or = 6 mo) 0.5 mL (Start on 9/23/2024  1:45 PM)    Acute cough        Need for vaccination for zoster        Encounter for screening mammogram for breast cancer         Relevant Orders    Mammo Digital Screening Bilat w/ Zackery            Plan:     Refill vit d  Try fluvastatin for hld. Rtc 3 mts with albs  Ct abd/pelvis for hematuria to r/o RCC. Will likely need referral to urology  For cough, try flonase bid and continue allegra for 2-4 wks. If no relief, try daily otc nexium. If persists, will need ent    Health Maintenance Topics with due status: Not Due       Topic Last Completion Date    TETANUS VACCINE 11/17/2017    Colorectal Cancer Screening 12/29/2021    Cervical Cancer Screening 01/23/2024    Hemoglobin A1c (Diabetic Prevention Screening) 09/23/2024    Lipid Panel 09/23/2024        Eye Exam - Recommend annually.    Dental Exam - Recommend biannual exams.     Vaccinations -   Immunization History   Administered Date(s) Administered    COVID-19, MRNA, LN-S, PF (Pfizer) (Purple Cap) 01/13/2021, 02/03/2021    DTP 1965, 01/06/1964, 02/17/1964, 04/07/1969    Influenza 09/20/2018, 11/09/2021    Influenza - Quadrivalent - PF *Preferred* (6 months and older) 10/03/2022    Influenza - Trivalent - Afluria, Fluzone MDV 09/20/2018    Influenza - Trivalent - Fluarix, Flulaval, Fluzone, Afluria - PF 11/09/2021    MMR 05/05/2000, 11/17/2017    Measles 06/09/1969    OPV 1965, 01/13/1964, 03/16/1964, 04/07/1969, 08/21/1978, 10/10/1978    Td (ADULT) 11/17/1977, 08/21/1978, 10/16/1978, 07/30/1988, 05/05/2000    Tdap 11/17/2017    Zoster Recombinant 10/03/2022      Patient was counseled on risks/benefits of receiving immunization. All questions were answered    Perform monthly self breast exams and call me immediately if you find a lump/bump or have any skin/nipple changes  Exercise for a total of 150 min per week and eat a healthy diet  Stay up to date with all cancer screening discussed in visit  Immunizations due were discussed during visit  All health maintenance was reviewed with patient. Patient verbalized understanding. All questions were answered.     Medication List  with Changes/Refills   New Medications    FLUVASTATIN (LESCOL) 20 MG CAPSULE    Take 1 capsule (20 mg total) by mouth every evening.       Start Date: 9/23/2024 End Date: 9/23/2025   Current Medications    ALPRAZOLAM (XANAX) 0.5 MG TABLET    Take 0.5 mg by mouth 3 (three) times daily.       Start Date: 3/25/2024 End Date: --    ESTRADIOL 0.1 MG/24 HR TD PTWK 0.1 MG/24 HR PTWK    Place 1 patch onto the skin every 7 days.       Start Date: 2/27/2024 End Date: --    FLUTICASONE PROPIONATE (FLONASE) 50 MCG/ACTUATION NASAL SPRAY    1 spray (50 mcg total) by Each Nostril route once daily.       Start Date: 3/8/2024  End Date: --    LOSARTAN-HYDROCHLOROTHIAZIDE 100-25 MG (HYZAAR) 100-25 MG PER TABLET    Take 1 tablet by mouth once daily.       Start Date: 10/18/2023End Date: 10/17/2024    MELOXICAM (MOBIC) 15 MG TABLET    TAKE 1 TABLET BY MOUTH EVERY DAY AS NEEDED FOR PAIN       Start Date: 10/19/2023End Date: --   Changed and/or Refilled Medications    Modified Medication Previous Medication    ERGOCALCIFEROL (ERGOCALCIFEROL) 50,000 UNIT CAP ergocalciferol (ERGOCALCIFEROL) 50,000 unit Cap       Take 1 capsule (50,000 Units total) by mouth every 7 days.    Take 50,000 Units by mouth every 7 days.       Start Date: 9/23/2024 End Date: 9/23/2025    Start Date: 8/30/2023 End Date: 9/23/2024   Discontinued Medications    ATORVASTATIN (LIPITOR) 20 MG TABLET    Take 1 tablet (20 mg total) by mouth once daily.       Start Date: 10/18/2023End Date: 9/23/2024    BROMPHENIRAMINE-PSEUDOEPH-DM (BROMFED DM) 2-30-10 MG/5 ML SYRP    TAKE 5 ML BY MOUTH EVERY 8 HOURS AS NEEDED FOR COUGH       Start Date: 3/13/2024 End Date: 9/23/2024    CHOLECALCIFEROL, VITAMIN D3, (VITAMIN D3) 50 MCG (2,000 UNIT) TAB    Take 1 tablet by mouth once daily.       Start Date: --        End Date: 9/23/2024    DULOXETINE (CYMBALTA) 30 MG CAPSULE    Take 30 mg by mouth once daily.       Start Date: 3/26/2024 End Date: 9/23/2024    GABAPENTIN (NEURONTIN) 300 MG  CAPSULE    Take 300 mg by mouth once daily.       Start Date: 2/20/2024 End Date: 9/23/2024    KETOCONAZOLE (NIZORAL) 2 % CREAM    SMARTSIG:sparingly Topical Twice Daily       Start Date: 8/7/2023  End Date: 9/23/2024    KETOCONAZOLE (NIZORAL) 2 % SHAMPOO    Apply topically 3 (three) times a week.       Start Date: 8/7/2023  End Date: 9/23/2024    MELOXICAM (MOBIC) 15 MG TABLET    Take 1 tablet (15 mg total) by mouth once daily.       Start Date: 1/17/2024 End Date: 9/23/2024    PRAZOSIN (MINIPRESS) 1 MG CAP    Take 1 mg by mouth every evening.       Start Date: 3/25/2024 End Date: 9/23/2024    PROGESTERONE (PROMETRIUM) 200 MG CAPSULE    Take 200 mg by mouth every evening.       Start Date: 2/27/2024 End Date: 9/23/2024          The patient's Health Maintenance was reviewed and the following appears to be due at this time:   Health Maintenance Due   Topic Date Due    HIV Screening  Never done    Shingles Vaccine (2 of 2) 11/28/2022    Influenza Vaccine (1) 09/01/2024    COVID-19 Vaccine (3 - 2023-24 season) 09/01/2024    Mammogram  09/18/2024         Follow up for 3 mts hld with labs and one year wellnes with labs. In addition to their scheduled follow up, the patient has also been instructed to follow up on as needed basis.

## 2024-09-23 NOTE — PROGRESS NOTES
Records Received, hyper-linked into chart at this time. The following record(s)  below were uploaded for Health Maintenance .           1/23/2024 PAP SMEAR

## 2024-09-23 NOTE — PROGRESS NOTES
Health Maintenance Topic(s) Outreach Outcomes & Actions Taken:    Cervical Cancer Screening - Outreach Outcomes & Actions Taken  : External Records Requested & Care Team Updated if Applicable         Additional Notes:  Request Pap Smear: [facility]

## 2024-10-04 ENCOUNTER — HOSPITAL ENCOUNTER (OUTPATIENT)
Dept: RADIOLOGY | Facility: HOSPITAL | Age: 59
Discharge: HOME | End: 2024-10-04
Attending: FAMILY MEDICINE
Payer: COMMERCIAL

## 2024-10-04 DIAGNOSIS — Z00.00 ENCOUNTER FOR WELLNESS EXAMINATION: ICD-10-CM

## 2024-10-04 DIAGNOSIS — Z12.31 ENCOUNTER FOR SCREENING MAMMOGRAM FOR BREAST CANCER: ICD-10-CM

## 2024-10-04 DIAGNOSIS — R31.29 MICROSCOPIC HEMATURIA: ICD-10-CM

## 2024-10-04 PROCEDURE — 77067 SCR MAMMO BI INCL CAD: CPT | Mod: 26,,, | Performed by: RADIOLOGY

## 2024-10-04 PROCEDURE — 77067 SCR MAMMO BI INCL CAD: CPT | Mod: TC

## 2024-10-04 PROCEDURE — 25500020 PHARM REV CODE 255

## 2024-10-04 PROCEDURE — 77063 BREAST TOMOSYNTHESIS BI: CPT | Mod: 26,,, | Performed by: RADIOLOGY

## 2024-10-04 PROCEDURE — 74178 CT ABD&PLV WO CNTR FLWD CNTR: CPT | Mod: TC

## 2024-10-04 RX ADMIN — IOHEXOL 100 ML: 350 INJECTION, SOLUTION INTRAVENOUS at 12:10

## 2024-10-07 DIAGNOSIS — R31.29 MICROSCOPIC HEMATURIA: Primary | ICD-10-CM

## 2024-10-10 DIAGNOSIS — E78.2 MIXED HYPERLIPIDEMIA: ICD-10-CM

## 2024-10-10 DIAGNOSIS — I10 PRIMARY HYPERTENSION: ICD-10-CM

## 2024-10-11 RX ORDER — LOSARTAN POTASSIUM AND HYDROCHLOROTHIAZIDE 25; 100 MG/1; MG/1
1 TABLET ORAL
Qty: 90 TABLET | Refills: 3 | Status: SHIPPED | OUTPATIENT
Start: 2024-10-11

## 2024-10-11 RX ORDER — ATORVASTATIN CALCIUM 20 MG/1
20 TABLET, FILM COATED ORAL
Qty: 90 TABLET | Refills: 3 | OUTPATIENT
Start: 2024-10-11

## 2024-10-30 ENCOUNTER — OFFICE VISIT (OUTPATIENT)
Dept: UROLOGY | Facility: CLINIC | Age: 59
End: 2024-10-30
Payer: COMMERCIAL

## 2024-10-30 VITALS
WEIGHT: 152.31 LBS | BODY MASS INDEX: 26 KG/M2 | HEART RATE: 88 BPM | DIASTOLIC BLOOD PRESSURE: 84 MMHG | SYSTOLIC BLOOD PRESSURE: 126 MMHG | HEIGHT: 64 IN

## 2024-10-30 DIAGNOSIS — N32.81 OAB (OVERACTIVE BLADDER): Primary | ICD-10-CM

## 2024-10-30 DIAGNOSIS — R31.29 MICROSCOPIC HEMATURIA: ICD-10-CM

## 2024-10-30 LAB
BILIRUB UR QL STRIP: NEGATIVE
GLUCOSE UR QL STRIP: NEGATIVE
KETONES UR QL STRIP: NEGATIVE
LEUKOCYTE ESTERASE UR QL STRIP: NEGATIVE
PH, POC UA: 6
POC BLOOD, URINE: POSITIVE
POC NITRATES, URINE: NEGATIVE
PROT UR QL STRIP: NEGATIVE
SP GR UR STRIP: 1.03 (ref 1–1.03)
UROBILINOGEN UR STRIP-ACNC: 0.2 (ref 0.1–1.1)

## 2024-10-30 PROCEDURE — 3008F BODY MASS INDEX DOCD: CPT | Mod: CPTII,S$GLB,, | Performed by: UROLOGY

## 2024-10-30 PROCEDURE — 3079F DIAST BP 80-89 MM HG: CPT | Mod: CPTII,S$GLB,, | Performed by: UROLOGY

## 2024-10-30 PROCEDURE — 3074F SYST BP LT 130 MM HG: CPT | Mod: CPTII,S$GLB,, | Performed by: UROLOGY

## 2024-10-30 PROCEDURE — 99204 OFFICE O/P NEW MOD 45 MIN: CPT | Mod: S$GLB,,, | Performed by: UROLOGY

## 2024-10-30 PROCEDURE — 1159F MED LIST DOCD IN RCRD: CPT | Mod: CPTII,S$GLB,, | Performed by: UROLOGY

## 2024-10-30 PROCEDURE — 81003 URINALYSIS AUTO W/O SCOPE: CPT | Mod: QW,S$GLB,, | Performed by: UROLOGY

## 2024-10-30 PROCEDURE — 1160F RVW MEDS BY RX/DR IN RCRD: CPT | Mod: CPTII,S$GLB,, | Performed by: UROLOGY

## 2024-10-30 PROCEDURE — 99999 PR PBB SHADOW E&M-EST. PATIENT-LVL IV: CPT | Mod: PBBFAC,,, | Performed by: UROLOGY

## 2024-10-30 PROCEDURE — 3044F HG A1C LEVEL LT 7.0%: CPT | Mod: CPTII,S$GLB,, | Performed by: UROLOGY

## 2024-10-30 RX ORDER — SOLIFENACIN SUCCINATE 5 MG/1
5 TABLET, FILM COATED ORAL DAILY
Qty: 30 TABLET | Refills: 11 | Status: SHIPPED | OUTPATIENT
Start: 2024-10-30 | End: 2025-10-30

## 2024-11-04 ENCOUNTER — PATIENT MESSAGE (OUTPATIENT)
Dept: FAMILY MEDICINE | Facility: CLINIC | Age: 59
End: 2024-11-04

## 2024-11-04 ENCOUNTER — E-VISIT (OUTPATIENT)
Dept: FAMILY MEDICINE | Facility: CLINIC | Age: 59
End: 2024-11-04
Payer: COMMERCIAL

## 2024-11-04 DIAGNOSIS — R30.0 DYSURIA: Primary | ICD-10-CM

## 2024-11-04 LAB
BACTERIA #/AREA URNS AUTO: ABNORMAL /HPF
BILIRUB UR QL STRIP.AUTO: NEGATIVE
CLARITY UR: ABNORMAL
COLOR UR AUTO: ABNORMAL
GLUCOSE UR QL STRIP: NORMAL
HGB UR QL STRIP: ABNORMAL
KETONES UR QL STRIP: NEGATIVE
LEUKOCYTE ESTERASE UR QL STRIP: 500
MUCOUS THREADS URNS QL MICRO: ABNORMAL /LPF
NITRITE UR QL STRIP: NEGATIVE
PH UR STRIP: 7.5 [PH]
PROT UR QL STRIP: ABNORMAL
RBC #/AREA URNS AUTO: ABNORMAL /HPF
SP GR UR STRIP.AUTO: 1.02 (ref 1–1.03)
SQUAMOUS #/AREA URNS LPF: ABNORMAL /HPF
UROBILINOGEN UR STRIP-ACNC: 2
WBC #/AREA URNS AUTO: >100 /HPF

## 2024-11-04 PROCEDURE — 87186 SC STD MICRODIL/AGAR DIL: CPT | Performed by: FAMILY MEDICINE

## 2024-11-04 PROCEDURE — 81001 URINALYSIS AUTO W/SCOPE: CPT | Performed by: FAMILY MEDICINE

## 2024-11-04 NOTE — PROGRESS NOTES
Patient ID: Marj Lazar is a 59 y.o. female.    Chief Complaint: General Illness (Entered automatically based on patient selection in [facility].)    The patient initiated a request through [facility] on 11/4/2024 for evaluation and management with a chief complaint of General Illness (Entered automatically based on patient selection in [facility].)     I evaluated the questionnaire submission on 11/4/2024.    [facility]-Common Problems    11/4/2024 12:17 PM CST - Filed by Patient   What do you need help with? Urinary Symptoms   Do you agree to participate in an E-Visit? Yes   If you have any of the following symptoms, please present to your local emergency room or call 911:  I acknowledge   What is the main issue you would like addressed today? Burning, frequent and painful urination   What symptoms do you currently have? Pain while passing urine;  Change in urine appearance or smell   When did your symptoms first appear? 10/31/2024   List what you have done or taken to help your symptoms. Increased water by mouth   Please indicate whether you have had the following symptoms during the past 24 hours     Urgent urination (a sudden and uncontrollable urge to urinate) Severe   Frequent urination of small amounts of urine (going to the toilet very often) Moderate   Burning pain when urinating Severe   Incomplete bladder emptying (still feel like you need to urinate again after urination) Severe   Pain not associated with urination in the lower abdomen below the belly button) None   What does your urine look like? Cloudy   Blood seen in the urine None   Flank pain (pain in one or both sides of the lower back) Mild   Abnormal Vaginal Discharge (abnormal amount, color and/or odor) None   Discharge from the urethra (urinary opening) without urination None   High body temperature/fever? None-<99.5   Please rate how much discomfort you have experience because of the symptoms in the past 24 hours: Moderate   Please  indicate how the symptoms have interfered with your every day activities/work in the past 24 hours: Moderate   Please indicate how these symptoms have interfered with your social activities (visiting people, meeting with friends, etc.) in the past 24 hours? Moderate   Are you a diabetic? No   Please indicate whether you have the following at the time of completion of this questionnaire: None of the above   Provide any additional information you feel is important.    Please attach any relevant images or files (if you have performed a home test for UTI, please submit a photo of results)    Are you able to take your vital signs? Yes   Systolic Blood Pressure: 132   Diastolic Blood Pressure: 85   Weight: 157   Height: 63   Pulse: 88   Temperature: 98.9   Respiration rate: 20   Pulse Oxygen: 100         Encounter Diagnosis   Name Primary?    Dysuria Yes        Orders Placed This Encounter   Procedures    Urine Culture High Risk     Order Specific Question:   Indicated criteria for high risk culture:     Answer:   Positive POCT UA    Urinalysis     Order Specific Question:   Collection Type     Answer:   Urine, Clean Catch      Medications Ordered This Encounter   Medications    nitrofurantoin, macrocrystal-monohydrate, (MACROBID) 100 MG capsule     Sig: Take 1 capsule (100 mg total) by mouth 2 (two) times daily. Take with food for 5 days     Dispense:  10 capsule     Refill:  0        No follow-ups on file.  Will check ua and urine culture. Patient just had cystoscopy which increases risk of UTI    UA abnormal. Will start macrobid bid x 5 days and AZO tabs. Urine culture pending.   Culture resistant to macrobid. Cipro 500 mg bid x 5 days sent in. Macrobid discontinued    E-Visit Time Tracking:    Day 1 Time (in minutes): 5    Total Time (in minutes): 5

## 2024-11-05 RX ORDER — NITROFURANTOIN 25; 75 MG/1; MG/1
100 CAPSULE ORAL 2 TIMES DAILY
Qty: 10 CAPSULE | Refills: 0 | Status: SHIPPED | OUTPATIENT
Start: 2024-11-05 | End: 2024-11-07

## 2024-11-07 LAB
BACTERIA UR CULT: ABNORMAL
BACTERIA UR CULT: ABNORMAL

## 2024-11-07 RX ORDER — CIPROFLOXACIN 500 MG/1
500 TABLET ORAL EVERY 12 HOURS
Qty: 10 TABLET | Refills: 0 | Status: SHIPPED | OUTPATIENT
Start: 2024-11-07 | End: 2024-11-12

## 2024-12-10 ENCOUNTER — OFFICE VISIT (OUTPATIENT)
Dept: UROLOGY | Facility: CLINIC | Age: 59
End: 2024-12-10
Payer: COMMERCIAL

## 2024-12-10 DIAGNOSIS — N32.81 OAB (OVERACTIVE BLADDER): Primary | ICD-10-CM

## 2024-12-10 NOTE — PROGRESS NOTES
"  The patient location is: [place]  The chief complaint leading to consultation is: OAB    Visit type: audiovisual    Face to Face time with patient: 5  7 minutes of total time spent on the encounter, which includes face to face time and non-face to face time preparing to see the patient (eg, review of tests), Obtaining and/or reviewing separately obtained history, Documenting clinical information in the electronic or other health record, Independently interpreting results (not separately reported) and communicating results to the patient/family/caregiver, or Care coordination (not separately reported).     Each patient to whom he or she provides medical services by telemedicine is:  (1) informed of the relationship between the physician and patient and the respective role of any other health care provider with respect to management of the patient; and (2) notified that he or she may decline to receive medical services by telemedicine and may withdraw from such care at any time.      Chief Complaint:  Micro hematuria    HPI:   12/10/2024 - patient returns today for follow-up, was treated for a Proteus UTI and has been taking the VESIcare as prescribed and noted "100% improvement" in her urinary symptoms, notes some dry mouth, no gross hematuria or dysuria    10/30/2024 - 58 yo female that presents for microhematuria.  Patient noted dysuria urinary urgency to PCP who obtained a urinalysis which showed infection, however does not appear that any urine culture was sent.  Patient was referred for microscopic hematuria.  She is a prior smoker.  Not currently on any medications for her bladder.  Notes that her urgency was worse but over the years has actually improved a little.  Not currently wearing any pads and denies any incontinence.  Denies prior urologic procedures.  Denies gross hematuria or family history of  cancers.    PMH:  Past Medical History:   Diagnosis Date    Anxiety     Hyperlipidemia     Hypertension  "       PSH:  Past Surgical History:   Procedure Laterality Date    CARPAL TUNNEL RELEASE      TONSILLECTOMY  1976    TUBAL LIGATION         Family History:  Family History   Problem Relation Name Age of Onset    Hypertension Mother      Heart disease Mother      Hypertension Father      Heart disease Father         Social History:  Social History     Tobacco Use    Smoking status: Never    Smokeless tobacco: Never   Substance Use Topics    Alcohol use: Yes    Drug use: Never        Review of Systems:  General: No fever, chills  Skin: No rashes  Chest:  Denies cough and sputum production  Heart: Denies chest pain  Resp: Denies dyspnea  Abdomen: Denies diarrhea, abdominal pain, hematemesis, or blood in stool.  Musculoskeletal: No joint stiffness or swelling. Denies back pain.  : see HPI  Neuro: no dizziness or weakness    Allergies:  Amlodipine    Medications:    Current Outpatient Medications:     ALPRAZolam (XANAX) 0.5 MG tablet, Take 0.5 mg by mouth 3 (three) times daily. (Patient not taking: Reported on 9/23/2024), Disp: , Rfl:     ergocalciferol (ERGOCALCIFEROL) 50,000 unit Cap, Take 1 capsule (50,000 Units total) by mouth every 7 days., Disp: 4 capsule, Rfl: 11    estradiol 0.1 mg/24 hr td ptwk 0.1 mg/24 hr PTWK, Place 1 patch onto the skin every 7 days. (Patient not taking: Reported on 9/23/2024), Disp: , Rfl:     fluticasone propionate (FLONASE) 50 mcg/actuation nasal spray, 1 spray (50 mcg total) by Each Nostril route once daily., Disp: 16 g, Rfl: 0    fluvastatin (LESCOL) 20 MG capsule, Take 1 capsule (20 mg total) by mouth every evening., Disp: 90 capsule, Rfl: 3    losartan-hydrochlorothiazide 100-25 mg (HYZAAR) 100-25 mg per tablet, TAKE 1 TABLET BY MOUTH EVERY DAY, Disp: 90 tablet, Rfl: 3    meloxicam (MOBIC) 15 MG tablet, TAKE 1 TABLET BY MOUTH EVERY DAY AS NEEDED FOR PAIN (Patient not taking: Reported on 3/8/2024), Disp: 30 tablet, Rfl: 0    solifenacin (VESICARE) 5 MG tablet, Take 1 tablet (5 mg  total) by mouth once daily., Disp: 30 tablet, Rfl: 11    Physical Exam:  There were no vitals filed for this visit.    There is no height or weight on file to calculate BMI.  General: awake, alert, cooperative  Head: NC/AT  Ears: external ears normal  Eyes: sclera normal  Lungs: normal inspiration, NAD  Heart: well-perfused  Abdomen: Soft, NT, ND  : External genitalia normal. No lesions. Meatus normal size and location. Urethra normal. No masses. Bladder normal. No fullness or masses. Vagina normal with  discharge or lesions. Anus/perineum normal. Uterus and adnexa without palpable abnormalities.  Skin: The skin is warm and dry.  Ext: No c/c/e.  Neuro: grossly intact, AOx3      RADIOLOGY:  CT ABDOMEN PELVIS W WO CONTRAST     CLINICAL HISTORY:  hematuria; Other microscopic hematuria     TECHNIQUE:  Low dose axial images, sagittal and coronal reformations were obtained from the lung bases to the pubic symphysis following the IV administration of  contrast.  Delayed imaging and pre contrasted imaging was performed as well. Automatic exposure control is utilized to reduce patient radiation exposure.     COMPARISON:  None     FINDINGS:  The lung bases are clear.     The liver appears normal.  There are couple of scattered cysts seen in the liver.  These are punctate..  Portal and hepatic veins appear normal.     The gallbladder appears grossly unremarkable.     The pancreas appears normal.  No pancreatic mass or lesion is seen.     The spleen appears normal.  No splenic mass or lesion is seen.     The adrenal glands appear normal.  No adrenal nodule is seen.     The kidneys are normal in size.  No hydronephrosis is seen.  No hydroureter is seen.  No nephrolithiasis or ureteral stone is seen.  Cortical nephrogram phase of the examination shows no cortical renal abnormality and delayed pyelogram phase of the examination shows no filling defects within the renal pelvis or calices or the visualized portions of the  ureters.     Urinary bladder appears normal.     The uterus is enlarged in size and there are multiple uterine fibroids seen.     No colitis is seen.  No diverticulitis is seen.  No colonic mass or lesion or inflammatory process is seen.     No free air is seen.  No free fluid is seen.     The bones appear grossly unremarkable.     Impression:     Enlarged uterus with multiple uterine fibroids    LABS:  I personally reviewed the following lab values:  Lab Results   Component Value Date    WBC 6.39 09/23/2024    HGB 13.0 09/23/2024    HCT 39.1 09/23/2024     09/23/2024     09/23/2024    K 3.4 (L) 09/23/2024     09/23/2024    CREATININE 0.88 09/23/2024    BUN 9.6 (L) 09/23/2024    CO2 23 09/23/2024    TSH 1.478 09/23/2024    HGBA1C 5.3 09/23/2024    CHOL 265 (H) 09/23/2024    TRIG 213 (H) 09/23/2024    HDL 48 09/23/2024    ALT 14 09/23/2024    AST 16 09/23/2024       URINALYSIS:  Urinalysis obtained in clinic today specific gravity > 1.030 trace intact blood, negative for all other parameters    Procedure:  Diagnostic Cystoscopy    Indications: microhematuria    UA: normal, see lab results for values    Procedure in Detail: After proper consents were obtained, the patient was prepped and draped in normal sterile fashion for diagnostic cystoscopy. 5 ml of lidocaine jelly was instilled in the urethra. The flexible cystoscope was then introduced into the urethra, and advanced into the bladder under direct vision. The urethral mucosa appeared normal, and no strictures were noted. The sphincter was normal. The bladder neck was normal. Inspection of the interior of the bladder was then carried out. The trigone was unremarkable, with no mucosal lesions. The ureteral orifices were normal in position and configuration. Systematic inspection of the mucosa of the bladder it was then carried out, rotating the cystoscope so that all areas of the left and right lateral walls, the dome of the bladder, and the  posterior wall were all visualized. The cystoscope was then advanced further into the bladder, and maximum deflection of the scope was performed so that the bladder neck could be inspected. No mucosal lesions were noted there. The cystoscope was then removed, and the procedure terminated. Patient tolerated the procedure well. No complications.     Findings: normal bladder, no lesions or tumors    Assessment/Plan:   Marj Lazar is a 59 y.o. female with:    Urgency - continue VESIcare, follow-up six weeks for virtual    Micro hematuria - negative workup    Stephen Michael MD  Urology

## 2025-01-15 ENCOUNTER — LAB VISIT (OUTPATIENT)
Dept: LAB | Facility: HOSPITAL | Age: 60
End: 2025-01-15
Attending: FAMILY MEDICINE
Payer: COMMERCIAL

## 2025-01-15 DIAGNOSIS — E78.2 MIXED HYPERLIPIDEMIA: ICD-10-CM

## 2025-01-15 DIAGNOSIS — R31.29 MICROSCOPIC HEMATURIA: ICD-10-CM

## 2025-01-15 LAB
ALBUMIN SERPL-MCNC: 4.1 G/DL (ref 3.4–4.8)
ALBUMIN/GLOB SERPL: 1.2 RATIO (ref 1.1–2)
ALP SERPL-CCNC: 76 UNIT/L (ref 40–150)
ALT SERPL-CCNC: 12 UNIT/L (ref 0–55)
ANION GAP SERPL CALC-SCNC: 8 MEQ/L
AST SERPL-CCNC: 16 UNIT/L (ref 5–34)
BACTERIA #/AREA URNS AUTO: ABNORMAL /HPF
BILIRUB SERPL-MCNC: 0.5 MG/DL
BILIRUB UR QL STRIP.AUTO: NEGATIVE
BUN SERPL-MCNC: 11.4 MG/DL (ref 9.8–20.1)
CALCIUM SERPL-MCNC: 10.7 MG/DL (ref 8.4–10.2)
CHLORIDE SERPL-SCNC: 104 MMOL/L (ref 98–107)
CHOLEST SERPL-MCNC: 257 MG/DL
CHOLEST/HDLC SERPL: 5 {RATIO} (ref 0–5)
CLARITY UR: ABNORMAL
CO2 SERPL-SCNC: 27 MMOL/L (ref 23–31)
COLOR UR AUTO: ABNORMAL
CREAT SERPL-MCNC: 0.91 MG/DL (ref 0.55–1.02)
CREAT/UREA NIT SERPL: 13
GFR SERPLBLD CREATININE-BSD FMLA CKD-EPI: >60 ML/MIN/1.73/M2
GLOBULIN SER-MCNC: 3.4 GM/DL (ref 2.4–3.5)
GLUCOSE SERPL-MCNC: 84 MG/DL (ref 82–115)
GLUCOSE UR QL STRIP: NORMAL
HDLC SERPL-MCNC: 54 MG/DL (ref 35–60)
HGB UR QL STRIP: NEGATIVE
HYALINE CASTS #/AREA URNS LPF: ABNORMAL /LPF
KETONES UR QL STRIP: NEGATIVE
LDLC SERPL CALC-MCNC: 167 MG/DL (ref 50–140)
LEUKOCYTE ESTERASE UR QL STRIP: 75
MUCOUS THREADS URNS QL MICRO: ABNORMAL /LPF
NITRITE UR QL STRIP: NEGATIVE
PH UR STRIP: 7 [PH]
POTASSIUM SERPL-SCNC: 3.6 MMOL/L (ref 3.5–5.1)
PROT SERPL-MCNC: 7.5 GM/DL (ref 5.8–7.6)
PROT UR QL STRIP: NEGATIVE
RBC #/AREA URNS AUTO: ABNORMAL /HPF
SODIUM SERPL-SCNC: 139 MMOL/L (ref 136–145)
SP GR UR STRIP.AUTO: 1.02 (ref 1–1.03)
SQUAMOUS #/AREA URNS LPF: ABNORMAL /HPF
TRIGL SERPL-MCNC: 178 MG/DL (ref 37–140)
UROBILINOGEN UR STRIP-ACNC: NORMAL
VLDLC SERPL CALC-MCNC: 36 MG/DL
WBC #/AREA URNS AUTO: ABNORMAL /HPF

## 2025-01-15 PROCEDURE — 80053 COMPREHEN METABOLIC PANEL: CPT

## 2025-01-15 PROCEDURE — 36415 COLL VENOUS BLD VENIPUNCTURE: CPT

## 2025-01-15 PROCEDURE — 81001 URINALYSIS AUTO W/SCOPE: CPT

## 2025-01-15 PROCEDURE — 80061 LIPID PANEL: CPT

## 2025-01-29 ENCOUNTER — OFFICE VISIT (OUTPATIENT)
Dept: FAMILY MEDICINE | Facility: CLINIC | Age: 60
End: 2025-01-29
Payer: COMMERCIAL

## 2025-01-29 VITALS
BODY MASS INDEX: 25.64 KG/M2 | HEIGHT: 64 IN | WEIGHT: 150.19 LBS | DIASTOLIC BLOOD PRESSURE: 89 MMHG | TEMPERATURE: 98 F | HEART RATE: 93 BPM | RESPIRATION RATE: 18 BRPM | OXYGEN SATURATION: 98 % | SYSTOLIC BLOOD PRESSURE: 125 MMHG

## 2025-01-29 DIAGNOSIS — E78.2 MIXED HYPERLIPIDEMIA: ICD-10-CM

## 2025-01-29 DIAGNOSIS — Z12.11 COLON CANCER SCREENING: ICD-10-CM

## 2025-01-29 DIAGNOSIS — E83.52 HYPERCALCEMIA: ICD-10-CM

## 2025-01-29 DIAGNOSIS — I10 PRIMARY HYPERTENSION: Primary | ICD-10-CM

## 2025-01-29 DIAGNOSIS — K21.9 GASTROESOPHAGEAL REFLUX DISEASE, UNSPECIFIED WHETHER ESOPHAGITIS PRESENT: ICD-10-CM

## 2025-01-29 PROCEDURE — 3008F BODY MASS INDEX DOCD: CPT | Mod: CPTII,,, | Performed by: FAMILY MEDICINE

## 2025-01-29 PROCEDURE — 3074F SYST BP LT 130 MM HG: CPT | Mod: CPTII,,, | Performed by: FAMILY MEDICINE

## 2025-01-29 PROCEDURE — 1160F RVW MEDS BY RX/DR IN RCRD: CPT | Mod: CPTII,,, | Performed by: FAMILY MEDICINE

## 2025-01-29 PROCEDURE — 3079F DIAST BP 80-89 MM HG: CPT | Mod: CPTII,,, | Performed by: FAMILY MEDICINE

## 2025-01-29 PROCEDURE — 99214 OFFICE O/P EST MOD 30 MIN: CPT | Mod: ,,, | Performed by: FAMILY MEDICINE

## 2025-01-29 PROCEDURE — 1159F MED LIST DOCD IN RCRD: CPT | Mod: CPTII,,, | Performed by: FAMILY MEDICINE

## 2025-01-29 RX ORDER — FLUVASTATIN 40 MG/1
40 CAPSULE ORAL NIGHTLY
Qty: 90 CAPSULE | Refills: 3 | Status: SHIPPED | OUTPATIENT
Start: 2025-01-29 | End: 2026-01-29

## 2025-01-29 RX ORDER — PANTOPRAZOLE SODIUM 40 MG/1
40 TABLET, DELAYED RELEASE ORAL DAILY
Qty: 90 TABLET | Refills: 0 | Status: SHIPPED | OUTPATIENT
Start: 2025-01-29 | End: 2026-01-29

## 2025-01-29 NOTE — PROGRESS NOTES
Marj Lazar  01/29/2025  [id number]    Kelly Garces MD  Patient Care Team:  Kelly Garces MD as PCP - General (Family Medicine)      Chief Complaint:  Chief Complaint   Patient presents with    Follow-up     6 month f/u with lab results       History of Present Illness:    59 y.o. female who presents today for htn, hld with labs. Labs reviewed by me and were discussed with patient. All questions regarding lab results were answered.     Labs reveal calcium 10.7, uncontrolled hld. Patient states she has been having to take tums several times a day for ongoing GERD. Pepcid and pepto bismol do not help.     Review of Systems  General: denies f/c, weight loss, night sweats, decreased appetite  Eye: denies blurred vision, changes in vision  Respiratory: denies sob, wheezing, cough  Cardiovascular: denies chest pain, palpitations, edema  Gastrointestinal: denies abdominal pain, n/v, constipation, diarrhea  Integumentary: denies rashes, pruritis    Past Medical History  Past Medical History:   Diagnosis Date    Anxiety     Hyperlipidemia     Hypertension        Medications  Medication List with Changes/Refills   New Medications    FLUVASTATIN (LESCOL) 40 MG CAPSULE    Take 1 capsule (40 mg total) by mouth every evening.    PANTOPRAZOLE (PROTONIX) 40 MG TABLET    Take 1 tablet (40 mg total) by mouth once daily.   Current Medications    ERGOCALCIFEROL (ERGOCALCIFEROL) 50,000 UNIT CAP    Take 1 capsule (50,000 Units total) by mouth every 7 days.    FLUTICASONE PROPIONATE (FLONASE) 50 MCG/ACTUATION NASAL SPRAY    1 spray (50 mcg total) by Each Nostril route once daily.    LOSARTAN-HYDROCHLOROTHIAZIDE 100-25 MG (HYZAAR) 100-25 MG PER TABLET    TAKE 1 TABLET BY MOUTH EVERY DAY    SOLIFENACIN (VESICARE) 5 MG TABLET    Take 1 tablet (5 mg total) by mouth once daily.   Discontinued Medications    ALPRAZOLAM (XANAX) 0.5 MG TABLET    Take 0.5 mg by mouth 3 (three) times daily.    ESTRADIOL 0.1 MG/24 HR TD PTWK 0.1  "MG/24 HR PTWK    Place 1 patch onto the skin every 7 days.    FLUVASTATIN (LESCOL) 20 MG CAPSULE    Take 1 capsule (20 mg total) by mouth every evening.    MELOXICAM (MOBIC) 15 MG TABLET    TAKE 1 TABLET BY MOUTH EVERY DAY AS NEEDED FOR PAIN       Past Surgical History:   Procedure Laterality Date    CARPAL TUNNEL RELEASE      TONSILLECTOMY  1976    TUBAL LIGATION         SUBJECTIVE:  Health Maintenance  Health Maintenance Topics with due status: Not Due       Topic Last Completion Date    TETANUS VACCINE 11/17/2017    Cervical Cancer Screening 01/23/2024    Hemoglobin A1c (Diabetic Prevention Screening) 09/23/2024    Mammogram 10/07/2024    Lipid Panel 01/15/2025     Health Maintenance Due   Topic Date Due    HIV Screening  Never done    Pneumococcal Vaccines (Age 50+) (1 of 1 - PCV) Never done    RSV Vaccine (Age 60+ and Pregnant patients) (1 - Risk 60-74 years 1-dose series) Never done    COVID-19 Vaccine (3 - 2024-25 season) 09/01/2024    Colorectal Cancer Screening  12/29/2024       Exam:  Vitals:    01/29/25 0824   BP: 125/89   BP Location: Left arm   Patient Position: Sitting   Pulse: 93   Resp: 18   Temp: 98.2 °F (36.8 °C)   TempSrc: Oral   SpO2: 98%   Weight: 68.1 kg (150 lb 3.2 oz)   Height: 5' 4" (1.626 m)     Weight: 68.1 kg (150 lb 3.2 oz)   Body mass index is 25.78 kg/m².      Physical Exam  Constitutional: NAD, alert, pleasant  Respiratory: CTAB, no wheezes, rales or rhonchi. No accessory muscle use  Eyes: EOMI  Cardiovascular: RRR, No m/r/g. No JVD. No LE edema  Integumentary: warm, dry, intact  Psych: AA&Ox3      ICD-10-CM ICD-9-CM   1. Primary hypertension  I10 401.9   2. Mixed hyperlipidemia  E78.2 272.2   3. Hypercalcemia  E83.52 275.42   4. Colon cancer screening  Z12.11 V76.51   5. Gastroesophageal reflux disease, unspecified whether esophagitis present  K21.9 530.81       1. Primary hypertension  Overview:  At goal on hyzaar. Asymptomatic    Continue current Rx meds        2. Mixed " hyperlipidemia  Overview:  Stopped taking lipitor due to myalgias. Currently on fluvastatin 20 mg daily. Ldl not at goal.    The 10-year ASCVD risk score (Darius FERGUSON, et al., 2019) is: 15.8%    Values used to calculate the score:      Age: 59 years      Sex: Female      Is Non- : Yes      Diabetic: No      Tobacco smoker: No      Systolic Blood Pressure: 152 mmHg      Is BP treated: Yes      HDL Cholesterol: 48 mg/dL      Total Cholesterol: 265 mg/dL    Increase fluvastatin to 40 mg and repeat lipids and cmp in 3 mts    Orders:  -     fluvastatin (LESCOL) 40 MG capsule; Take 1 capsule (40 mg total) by mouth every evening.  Dispense: 90 capsule; Refill: 3  -     Lipid Panel; Future; Expected date: 04/29/2025    3. Hypercalcemia  Overview:  On thiazide, was taking daily tums    Stop tums  Repeat in 3 mts. If remains elevated, will initiate work up    Orders:  -     Comprehensive Metabolic Panel; Future; Expected date: 04/29/2025    4. Colon cancer screening  -     Cologuard Screening (Multitarget Stool DNA); Future; Expected date: 01/29/2025    5. Gastroesophageal reflux disease, unspecified whether esophagitis present  Overview:  Will start protonix 40 mg daily. If still having symptoms in 3 mts, will need egd    Stick to a bland diet. Avoid spicy foods, red sauces, sodas, chocolate, pepper, extra seasoning on food, hot chips.   Eat things like rice, bread, soup, baked meats, water.  Take meds as directed.  Avoid nsaids and aspirin unless medically necessary. If necessary, take with food for the shortest duration.       Orders:  -     pantoprazole (PROTONIX) 40 MG tablet; Take 1 tablet (40 mg total) by mouth once daily.  Dispense: 90 tablet; Refill: 0         Follow up: No follow-ups on file.      Care Plan/Goals: Reviewed   Goals    None

## 2025-02-18 ENCOUNTER — RESULTS FOLLOW-UP (OUTPATIENT)
Dept: FAMILY MEDICINE | Facility: CLINIC | Age: 60
End: 2025-02-18

## 2025-02-18 ENCOUNTER — PATIENT MESSAGE (OUTPATIENT)
Dept: FAMILY MEDICINE | Facility: CLINIC | Age: 60
End: 2025-02-18
Payer: COMMERCIAL

## 2025-02-18 DIAGNOSIS — R19.5 POSITIVE COLORECTAL CANCER SCREENING USING COLOGUARD TEST: Primary | ICD-10-CM

## 2025-02-18 DIAGNOSIS — E78.2 MIXED HYPERLIPIDEMIA: Primary | ICD-10-CM

## 2025-02-18 NOTE — PROGRESS NOTES
Ninoska is positive. This tests for polyp dna which could be caused by a colon cancer or polyp that is high risk for turning into colon cancer. She needs a colonoscopy so I am placing a referral to a gi group and they will call her to schedule her.

## 2025-04-08 ENCOUNTER — LAB VISIT (OUTPATIENT)
Dept: LAB | Facility: HOSPITAL | Age: 60
End: 2025-04-08
Attending: FAMILY MEDICINE
Payer: COMMERCIAL

## 2025-04-08 DIAGNOSIS — E78.2 MIXED HYPERLIPIDEMIA: ICD-10-CM

## 2025-04-08 DIAGNOSIS — E83.52 HYPERCALCEMIA: ICD-10-CM

## 2025-04-08 LAB
ALBUMIN SERPL-MCNC: 4 G/DL (ref 3.4–4.8)
ALBUMIN/GLOB SERPL: 1.2 RATIO (ref 1.1–2)
ALP SERPL-CCNC: 66 UNIT/L (ref 40–150)
ALT SERPL-CCNC: 8 UNIT/L (ref 0–55)
ANION GAP SERPL CALC-SCNC: 8 MEQ/L
AST SERPL-CCNC: 12 UNIT/L (ref 11–45)
BILIRUB SERPL-MCNC: 0.7 MG/DL
BUN SERPL-MCNC: 9.9 MG/DL (ref 9.8–20.1)
CALCIUM SERPL-MCNC: 9.3 MG/DL (ref 8.4–10.2)
CHLORIDE SERPL-SCNC: 105 MMOL/L (ref 98–107)
CHOLEST SERPL-MCNC: 209 MG/DL
CHOLEST/HDLC SERPL: 4 {RATIO} (ref 0–5)
CO2 SERPL-SCNC: 24 MMOL/L (ref 23–31)
CREAT SERPL-MCNC: 0.83 MG/DL (ref 0.55–1.02)
CREAT/UREA NIT SERPL: 12
GFR SERPLBLD CREATININE-BSD FMLA CKD-EPI: >60 ML/MIN/1.73/M2
GLOBULIN SER-MCNC: 3.3 GM/DL (ref 2.4–3.5)
GLUCOSE SERPL-MCNC: 79 MG/DL (ref 82–115)
HDLC SERPL-MCNC: 48 MG/DL (ref 35–60)
LDLC SERPL CALC-MCNC: 127 MG/DL (ref 50–140)
POTASSIUM SERPL-SCNC: 3.6 MMOL/L (ref 3.5–5.1)
PROT SERPL-MCNC: 7.3 GM/DL (ref 5.8–7.6)
SODIUM SERPL-SCNC: 137 MMOL/L (ref 136–145)
TRIGL SERPL-MCNC: 170 MG/DL (ref 37–140)
VLDLC SERPL CALC-MCNC: 34 MG/DL

## 2025-04-08 PROCEDURE — 80053 COMPREHEN METABOLIC PANEL: CPT

## 2025-04-08 PROCEDURE — 36415 COLL VENOUS BLD VENIPUNCTURE: CPT

## 2025-04-08 PROCEDURE — 80061 LIPID PANEL: CPT

## 2025-04-08 RX ORDER — EZETIMIBE 10 MG/1
10 TABLET ORAL DAILY
Qty: 90 TABLET | Refills: 3 | Status: SHIPPED | OUTPATIENT
Start: 2025-04-08 | End: 2026-04-08

## 2025-04-17 DIAGNOSIS — K21.9 GASTROESOPHAGEAL REFLUX DISEASE, UNSPECIFIED WHETHER ESOPHAGITIS PRESENT: ICD-10-CM

## 2025-04-17 RX ORDER — PANTOPRAZOLE SODIUM 40 MG/1
40 TABLET, DELAYED RELEASE ORAL DAILY
Qty: 90 TABLET | Refills: 0 | Status: SHIPPED | OUTPATIENT
Start: 2025-04-17 | End: 2025-07-16

## 2025-07-15 DIAGNOSIS — K21.9 GASTROESOPHAGEAL REFLUX DISEASE, UNSPECIFIED WHETHER ESOPHAGITIS PRESENT: ICD-10-CM

## 2025-07-15 RX ORDER — PANTOPRAZOLE SODIUM 40 MG/1
40 TABLET, DELAYED RELEASE ORAL
Qty: 90 TABLET | Refills: 0 | Status: SHIPPED | OUTPATIENT
Start: 2025-07-15